# Patient Record
Sex: MALE | Race: WHITE | URBAN - METROPOLITAN AREA
[De-identification: names, ages, dates, MRNs, and addresses within clinical notes are randomized per-mention and may not be internally consistent; named-entity substitution may affect disease eponyms.]

---

## 2020-12-13 PROCEDURE — 99285 EMERGENCY DEPT VISIT HI MDM: CPT

## 2020-12-14 ENCOUNTER — INPATIENT (INPATIENT)
Facility: HOSPITAL | Age: 85
LOS: 3 days | Discharge: NURSING, REHAB OR LONG-TERM CARE | End: 2020-12-18
Payer: MEDICARE

## 2020-12-14 PROCEDURE — 93010 ELECTROCARDIOGRAM REPORT: CPT

## 2020-12-14 PROCEDURE — 73620 X-RAY EXAM OF FOOT: CPT | Mod: 26,RT

## 2020-12-14 PROCEDURE — 71045 X-RAY EXAM CHEST 1 VIEW: CPT | Mod: 26

## 2020-12-14 PROCEDURE — 93971 EXTREMITY STUDY: CPT | Mod: 26,RT

## 2021-02-15 ENCOUNTER — EMERGENCY (EMERGENCY)
Facility: HOSPITAL | Age: 86
LOS: 0 days | Discharge: HOME | End: 2021-02-15
Attending: EMERGENCY MEDICINE
Payer: MEDICARE

## 2021-02-15 VITALS
HEIGHT: 68 IN | TEMPERATURE: 97 F | WEIGHT: 169.98 LBS | DIASTOLIC BLOOD PRESSURE: 103 MMHG | HEART RATE: 72 BPM | RESPIRATION RATE: 18 BRPM | SYSTOLIC BLOOD PRESSURE: 160 MMHG | OXYGEN SATURATION: 97 %

## 2021-02-15 VITALS
RESPIRATION RATE: 16 BRPM | SYSTOLIC BLOOD PRESSURE: 146 MMHG | HEART RATE: 64 BPM | OXYGEN SATURATION: 98 % | DIASTOLIC BLOOD PRESSURE: 90 MMHG
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
## 2021-02-15 DIAGNOSIS — F32.9 MAJOR DEPRESSIVE DISORDER, SINGLE EPISODE, UNSPECIFIED: ICD-10-CM

## 2021-02-15 DIAGNOSIS — Z04.3 ENCOUNTER FOR EXAMINATION AND OBSERVATION FOLLOWING OTHER ACCIDENT: ICD-10-CM

## 2021-02-15 DIAGNOSIS — F03.90 UNSPECIFIED DEMENTIA WITHOUT BEHAVIORAL DISTURBANCE: ICD-10-CM
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
## 2021-02-15 DIAGNOSIS — S09.90XA UNSPECIFIED INJURY OF HEAD, INITIAL ENCOUNTER: ICD-10-CM

## 2021-02-15 PROCEDURE — 93005 ELECTROCARDIOGRAM TRACING: CPT

## 2021-02-15 PROCEDURE — 93010 ELECTROCARDIOGRAM REPORT: CPT

## 2021-02-15 PROCEDURE — 70450 CT HEAD/BRAIN W/O DYE: CPT

## 2021-02-15 PROCEDURE — 99284 EMERGENCY DEPT VISIT MOD MDM: CPT | Mod: 25

## 2021-02-15 PROCEDURE — 99283 EMERGENCY DEPT VISIT LOW MDM: CPT

## 2021-02-15 PROCEDURE — 70450 CT HEAD/BRAIN W/O DYE: CPT | Mod: 26

## 2021-02-15 NOTE — ED PROVIDER NOTE - OBJECTIVE STATEMENT
86 y/o M with PMHx of dementia presents to the ED BIBEMS from [facility] s/p mechanical trip and fall striking head. Has no complaints of pain. No LOC or fever. Does not recall fall. Not on AC. NKDA. Pt is poor historian 2/2 baseline dementia. PCP: Dr. Candelaria. 86 y/o M with PMHx of dementia, depression presents to the ED BIBEMS from [facility] s/p mechanical trip and fall striking head. Has no complaints of pain. No LOC or fever. Does not recall fall. Not on AC. NKDA. Pt is poor historian 2/2 baseline dementia. PCP: Dr. Candelaria.

## 2021-02-15 NOTE — ED ADULT NURSE NOTE - OBJECTIVE STATEMENT
Pt brought in from [facility] for unwitnessed fall. Hx dementia, poor historian. Pt alert but aox2. Pt thinks he landed on back but not sure. Denies any pain. Pt brought in from [facility] for unwitnessed fall. Hx dementia, poor historian. Pt alert but aox2. Pt thinks he landed on back but not sure. Denies any pain. Pt was taken directly to CT scan from EMS

## 2021-02-15 NOTE — ED PROVIDER NOTE - PATIENT PORTAL LINK FT
You can access the FollowMyHealth Patient Portal offered by [facility] by registering at the following website: [email or web address]. By joining [employer]’s FollowMyHealth portal, you will also be able to view your health information using other applications (apps) compatible with our system.

## 2021-02-15 NOTE — ED ADULT NURSE NOTE - NSIMPLEMENTINTERV_GEN_ALL_ED
Implemented All Fall with Harm Risk Interventions:  [place] to call system. Call bell, personal items and telephone within reach. Instruct patient to call for assistance. Room bathroom lighting operational. Non-slip footwear when patient is off stretcher. Physically safe environment: no spills, clutter or unnecessary equipment. Stretcher in lowest position, wheels locked, appropriate side rails in place. Provide visual cue, wrist band, yellow gown, etc. Monitor gait and stability. Monitor for mental status changes and reorient to person, place, and time. Review medications for side effects contributing to fall risk. Reinforce activity limits and safety measures with patient and family. Provide visual clues: red socks.

## 2021-02-15 NOTE — ED ADULT TRIAGE NOTE - CHIEF COMPLAINT QUOTE
pt bibems from [facility] for mechanical fall, as per ems, pt tripped and fell, head injury.  Denies loc, pt not on blood thinners, neuro alert initiated, md lazo made aware, pt sent to CT

## 2021-02-15 NOTE — ED PROVIDER NOTE - NS_ ATTENDINGSCRIBEDETAILS _ED_A_ED_FT
I, Trever Beal MD,  performed the initial face to face bedside interview with this patient regarding history of present illness, review of symptoms and relevant past medical, social and family history.  I completed an independent physical examination.    The history, relevant review of systems, past medical and surgical history, medical decision making, and physical examination was documented by the scribe in my presence and I attest to the accuracy of the documentation.

## 2021-02-15 NOTE — ED ADULT NURSE NOTE - CHIEF COMPLAINT QUOTE
pt bibems from [facility] for mechanical fall, as per ems, pt tripped and fell, head injury.  Denies loc, pt not on blood thinners, neuro alert initiated, md lazo made aware, pt sent to CT

## 2021-05-02 ENCOUNTER — EMERGENCY (EMERGENCY)
Facility: HOSPITAL | Age: 86
LOS: 0 days | Discharge: HOME | End: 2021-05-02
Attending: EMERGENCY MEDICINE
Payer: MEDICARE

## 2021-05-02 VITALS
HEART RATE: 72 BPM | SYSTOLIC BLOOD PRESSURE: 152 MMHG | TEMPERATURE: 98 F | DIASTOLIC BLOOD PRESSURE: 88 MMHG | OXYGEN SATURATION: 98 % | RESPIRATION RATE: 18 BRPM

## 2021-05-02 VITALS
OXYGEN SATURATION: 93 % | WEIGHT: 175.05 LBS | HEIGHT: 68 IN | TEMPERATURE: 98 F | SYSTOLIC BLOOD PRESSURE: 145 MMHG | HEART RATE: 76 BPM | DIASTOLIC BLOOD PRESSURE: 95 MMHG | RESPIRATION RATE: 18 BRPM

## 2021-05-02 DIAGNOSIS — I44.0 ATRIOVENTRICULAR BLOCK, FIRST DEGREE: ICD-10-CM

## 2021-05-02 DIAGNOSIS — F32.9 MAJOR DEPRESSIVE DISORDER, SINGLE EPISODE, UNSPECIFIED: ICD-10-CM

## 2021-05-02 DIAGNOSIS — F03.90 UNSPECIFIED DEMENTIA WITHOUT BEHAVIORAL DISTURBANCE: ICD-10-CM

## 2021-05-02 DIAGNOSIS — F60.3 BORDERLINE PERSONALITY DISORDER: ICD-10-CM

## 2021-05-02 LAB
ALBUMIN SERPL ELPH-MCNC: 3.4 G/DL — SIGNIFICANT CHANGE UP (ref 3.3–5)
ALP SERPL-CCNC: 92 U/L — SIGNIFICANT CHANGE UP (ref 40–120)
ALT FLD-CCNC: 13 U/L — SIGNIFICANT CHANGE UP (ref 12–78)
ANION GAP SERPL CALC-SCNC: 3 MMOL/L — LOW (ref 5–17)
APPEARANCE UR: ABNORMAL
AST SERPL-CCNC: 29 U/L — SIGNIFICANT CHANGE UP (ref 15–37)
BASOPHILS # BLD AUTO: 0.02 K/UL — SIGNIFICANT CHANGE UP (ref 0–0.2)
BASOPHILS NFR BLD AUTO: 0.4 % — SIGNIFICANT CHANGE UP (ref 0–2)
BILIRUB SERPL-MCNC: 1.2 MG/DL — SIGNIFICANT CHANGE UP (ref 0.2–1.2)
BILIRUB UR-MCNC: NEGATIVE — SIGNIFICANT CHANGE UP
BUN SERPL-MCNC: 15 MG/DL — SIGNIFICANT CHANGE UP (ref 7–23)
CALCIUM SERPL-MCNC: 9 MG/DL — SIGNIFICANT CHANGE UP (ref 8.5–10.1)
CHLORIDE SERPL-SCNC: 107 MMOL/L — SIGNIFICANT CHANGE UP (ref 96–108)
CK SERPL-CCNC: 193 U/L — SIGNIFICANT CHANGE UP (ref 26–308)
CO2 SERPL-SCNC: 31 MMOL/L — SIGNIFICANT CHANGE UP (ref 22–31)
COLOR SPEC: YELLOW — SIGNIFICANT CHANGE UP
CREAT SERPL-MCNC: 1.14 MG/DL — SIGNIFICANT CHANGE UP (ref 0.5–1.3)
DIFF PNL FLD: NEGATIVE — SIGNIFICANT CHANGE UP
EOSINOPHIL # BLD AUTO: 0.01 K/UL — SIGNIFICANT CHANGE UP (ref 0–0.5)
EOSINOPHIL NFR BLD AUTO: 0.2 % — SIGNIFICANT CHANGE UP (ref 0–6)
GLUCOSE SERPL-MCNC: 88 MG/DL — SIGNIFICANT CHANGE UP (ref 70–99)
GLUCOSE UR QL: NEGATIVE MG/DL — SIGNIFICANT CHANGE UP
HCT VFR BLD CALC: 41.3 % — SIGNIFICANT CHANGE UP (ref 39–50)
HGB BLD-MCNC: 13.4 G/DL — SIGNIFICANT CHANGE UP (ref 13–17)
IMM GRANULOCYTES NFR BLD AUTO: 0.2 % — SIGNIFICANT CHANGE UP (ref 0–1.5)
KETONES UR-MCNC: ABNORMAL
LEUKOCYTE ESTERASE UR-ACNC: ABNORMAL
LYMPHOCYTES # BLD AUTO: 1.08 K/UL — SIGNIFICANT CHANGE UP (ref 1–3.3)
LYMPHOCYTES # BLD AUTO: 20.3 % — SIGNIFICANT CHANGE UP (ref 13–44)
MCHC RBC-ENTMCNC: 30.8 PG — SIGNIFICANT CHANGE UP (ref 27–34)
MCHC RBC-ENTMCNC: 32.4 GM/DL — SIGNIFICANT CHANGE UP (ref 32–36)
MCV RBC AUTO: 94.9 FL — SIGNIFICANT CHANGE UP (ref 80–100)
MONOCYTES # BLD AUTO: 0.52 K/UL — SIGNIFICANT CHANGE UP (ref 0–0.9)
MONOCYTES NFR BLD AUTO: 9.8 % — SIGNIFICANT CHANGE UP (ref 2–14)
NEUTROPHILS # BLD AUTO: 3.69 K/UL — SIGNIFICANT CHANGE UP (ref 1.8–7.4)
NEUTROPHILS NFR BLD AUTO: 69.1 % — SIGNIFICANT CHANGE UP (ref 43–77)
NITRITE UR-MCNC: NEGATIVE — SIGNIFICANT CHANGE UP
PH UR: 6.5 — SIGNIFICANT CHANGE UP (ref 5–8)
PLATELET # BLD AUTO: 160 K/UL — SIGNIFICANT CHANGE UP (ref 150–400)
POTASSIUM SERPL-MCNC: 3.9 MMOL/L — SIGNIFICANT CHANGE UP (ref 3.5–5.3)
POTASSIUM SERPL-SCNC: 3.9 MMOL/L — SIGNIFICANT CHANGE UP (ref 3.5–5.3)
PROT SERPL-MCNC: 7.3 GM/DL — SIGNIFICANT CHANGE UP (ref 6–8.3)
PROT UR-MCNC: NEGATIVE MG/DL — SIGNIFICANT CHANGE UP
RBC # BLD: 4.35 M/UL — SIGNIFICANT CHANGE UP (ref 4.2–5.8)
RBC # FLD: 12.6 % — SIGNIFICANT CHANGE UP (ref 10.3–14.5)
SODIUM SERPL-SCNC: 141 MMOL/L — SIGNIFICANT CHANGE UP (ref 135–145)
SP GR SPEC: 1.01 — SIGNIFICANT CHANGE UP (ref 1.01–1.02)
UROBILINOGEN FLD QL: 1 MG/DL
WBC # BLD: 5.33 K/UL — SIGNIFICANT CHANGE UP (ref 3.8–10.5)
WBC # FLD AUTO: 5.33 K/UL — SIGNIFICANT CHANGE UP (ref 3.8–10.5)

## 2021-05-02 PROCEDURE — 96372 THER/PROPH/DIAG INJ SC/IM: CPT

## 2021-05-02 PROCEDURE — 99284 EMERGENCY DEPT VISIT MOD MDM: CPT | Mod: 25

## 2021-05-02 PROCEDURE — 85025 COMPLETE CBC W/AUTO DIFF WBC: CPT

## 2021-05-02 PROCEDURE — 99285 EMERGENCY DEPT VISIT HI MDM: CPT

## 2021-05-02 PROCEDURE — 81001 URINALYSIS AUTO W/SCOPE: CPT

## 2021-05-02 PROCEDURE — 93005 ELECTROCARDIOGRAM TRACING: CPT

## 2021-05-02 PROCEDURE — 82550 ASSAY OF CK (CPK): CPT

## 2021-05-02 PROCEDURE — 36415 COLL VENOUS BLD VENIPUNCTURE: CPT

## 2021-05-02 PROCEDURE — 87086 URINE CULTURE/COLONY COUNT: CPT

## 2021-05-02 PROCEDURE — 70450 CT HEAD/BRAIN W/O DYE: CPT

## 2021-05-02 PROCEDURE — 70450 CT HEAD/BRAIN W/O DYE: CPT | Mod: 26

## 2021-05-02 PROCEDURE — 93010 ELECTROCARDIOGRAM REPORT: CPT

## 2021-05-02 PROCEDURE — 80053 COMPREHEN METABOLIC PANEL: CPT

## 2021-05-02 RX ORDER — SODIUM CHLORIDE 9 MG/ML
500 INJECTION INTRAMUSCULAR; INTRAVENOUS; SUBCUTANEOUS ONCE
Refills: 0 | Status: COMPLETED | OUTPATIENT
Start: 2021-05-02 | End: 2021-05-02

## 2021-05-02 RX ORDER — HALOPERIDOL DECANOATE 100 MG/ML
2 INJECTION INTRAMUSCULAR ONCE
Refills: 0 | Status: COMPLETED | OUTPATIENT
Start: 2021-05-02 | End: 2021-05-02

## 2021-05-02 RX ORDER — QUETIAPINE FUMARATE 200 MG/1
25 TABLET, FILM COATED ORAL ONCE
Refills: 0 | Status: COMPLETED | OUTPATIENT
Start: 2021-05-02 | End: 2021-05-02

## 2021-05-02 RX ADMIN — HALOPERIDOL DECANOATE 2 MILLIGRAM(S): 100 INJECTION INTRAMUSCULAR at 17:08

## 2021-05-02 RX ADMIN — QUETIAPINE FUMARATE 25 MILLIGRAM(S): 200 TABLET, FILM COATED ORAL at 18:24

## 2021-05-02 RX ADMIN — SODIUM CHLORIDE 1000 MILLILITER(S): 9 INJECTION INTRAMUSCULAR; INTRAVENOUS; SUBCUTANEOUS at 13:41

## 2021-05-02 NOTE — ED PROVIDER NOTE - UNABLE TO OBTAIN
Pt unable to contribute to ROS 2/2 hx dementia. Dementia Pt unable to contribute accurate hx 2/2 hx dementia.

## 2021-05-02 NOTE — ED ADULT NURSE NOTE - OBJECTIVE STATEMENT
Patient brought in by ambulance for aggressive behavior at [facility]. Patient has been calm and cooperative while in the ED. Patient alert and very forgetful.

## 2021-05-02 NOTE — ED ADULT NURSE NOTE - CHIEF COMPLAINT QUOTE
pt presents to ED with complaints of aggressive behavior at [facility]. per EMS, pt was walking the track at [facility] when he became aggressive with staff when he did not want to walk anymore. pt has no medical complaints. pt has hx of dementia.

## 2021-05-02 NOTE — ED PROVIDER NOTE - OBJECTIVE STATEMENT
87 y/o M PMHx of dementia, depression presents ambulatory BIBA to ED sent by [facility] as staff states pt is w/ aggressive behavior. Pt was walking the track at [facility] and reportedly became aggressive w/ staff when he did not want to walk anymore. Pt endorses slight lightheadedness when lying down to sitting up. Denies fall, no n/v/d. 87 y/o M PMHx of dementia, depression presents ambulatory BIBA to ED sent by [facility] as staff states pt is w/ aggressive behavior. Pt states he was walking the track at [facility] x3-4 hours. Per  [facility] staff reportedly became aggressive w/ staff when he did not want to walk anymore. Pt endorses slight lightheadedness when lying down to sitting up. Denies fall, no n/v/d. 85 y/o M PMHx of dementia, depression presents ambulatory BIBA to ED sent by [facility] as staff states pt is w/ aggressive behavior. Pt states he was walking the track at [facility] x3-4 hours. Per [facility] staff, pt reportedly became aggressive w/ staff when he did not want to walk anymore. Pt endorses slight lightheadedness when lying down to sitting up. Denies fall, no n/v/d.

## 2021-05-02 NOTE — ED PROVIDER NOTE - PROGRESS NOTE DETAILS
Ade Samuels for attending Dr. Almonte: [id number] Attempted to call [facility] ([phone]) multiple rings not able to . Called pt's son Mario Ragland ([phone]), pt was called to see pt by [facility] staff due to pt not acting himself and not walking, however, when son arrived at [facility], pt was acting appropriately and walking. Pt has hx of dementia, has not been sick, no v/d. Raymon Bryant MD : signed out to me pending labs, labs wnl, d/w son gabriela will dc back to [facility]

## 2021-05-02 NOTE — ED PROVIDER NOTE - CARDIAC, MLM
Normal rate, regular rhythm.  Heart sounds S1, S2.  No murmurs, rubs or gallops. Normal rate, regular rhythm.  Heart sounds S1, S2.  No murmurs, rubs or gallops. No pedal edema.

## 2021-05-02 NOTE — ED PROVIDER NOTE - PATIENT PORTAL LINK FT
You can access the FollowMyHealth Patient Portal offered by [facility] by registering at the following website: [email or web address]. By joining [employer]’s FollowMyHealth portal, you will also be able to view your health information using other applications (apps) compatible with our system.

## 2021-05-02 NOTE — ED ADULT NURSE NOTE - NS ED NOTE ABUSE RESPONSE YN
Principal Discharge DX:	Arthritis  Goal:	Increase mobility  Instructions for follow-up, activity and diet:	Wound assessment
Unable to assess due to medical condition

## 2021-05-02 NOTE — ED ADULT TRIAGE NOTE - BP NONINVASIVE DIASTOLIC (MM HG)
Patient declined to placed on BIPAP unit, Patient currently on HFNC 30 LPM 90% with SpO2 98%. All nebs given as ordered. Will continue to monitor.    Lesa Patino RT  10/9/2017     95

## 2021-05-02 NOTE — ED PROVIDER NOTE - NSFOLLOWUPINSTRUCTIONS_ED_ALL_ED_FT
Altered Mental Status    WHAT YOU NEED TO KNOW:    What is altered mental status? Altered mental status (AMS) is a disruption in how your brain works that causes a change in behavior. This change can happen suddenly or over days. AMS ranges from slight confusion to total disorientation and increased sleepiness to coma.     What causes AMS? AMS can be caused by physical, psychological, and environmental factors. In older adults, AMS may be caused by a combination of these factors. The following are some of the most common causes of AMS:  •Diseases or conditions in the body that can affect your brain and nervous system: ?Hypoxia (low oxygen levels)       ?Low or high blood sugar levels, or diabetic ketoacidosis      ?Heart attack       ?Dehydration, low or high blood sodium levels       ?Thyroid or adrenal gland disease       ?Urinary tract infection or renal failure       •Diseases or conditions within your skull: ?Seizures       ?Head traumas, concussions       ?Brain tumors or strokes       ?Brain disease, such as encephalopathy      ?High altitude cerebral edema (brain tissue swelling at high altitude)       •Other factors: ?Burns      ?Hypothermia (low body temperature), hyperthermia (high body temperature), or heat stroke       ?Toxic plants, such as mushrooms      ?Carbon monoxide       ?Drug or alcohol withdrawal       ?Psychiatric problems         What factors increase the risk for AMS?   •Older adults may have AMS after changes in their medicines, heart attacks, or hip fractures. Infections, such as urinary tract infections or pneumonia, can also increase the risk for AMS.      •A medical history of high blood pressure, heart problems, diabetes, or psychiatric illness increases the risk for AMS.       What are the signs and symptoms of AMS? You, or those close to you, will notice changes in how you think and in your awareness, movements, and routines. Some of the more common signs are:   •Lack of concentration or forgetfulness       •Slow responses       •Hallucinations and changes in sleep patterns       •Decreased or increased movement       •Agitation or rambling speech       •Cannot or will not follow reasonable requests       •Cannot be aroused from sleep       How is AMS diagnosed? Your healthcare provider will ask you and your family about your usual mental status. He will want know if the changes happened suddenly or over time. He will want to know about recent events, such as falls or other traumas or illnesses. He may ask witnesses about your behavior. Your healthcare provider will ask about the medicines you take and any problems with substance abuse. He will do a physical exam. You may also need the following tests to learn the cause of your AMS:  •A neurological exam tells healthcare providers if you are having problems with your brain or nerves. During the exam, your reflexes will be tested.       •Vital signs give healthcare providers information about your current health. Healthcare providers will check your blood pressure, heart rate, breathing rate, and temperature. They will also ask about your pain. They will measure the amount of oxygen in your blood with a device called a pulse oximeter.       •Blood tests are done to check the function of your liver, kidneys, and lungs. They will also show if you have an infection or other toxins in your body. Your blood glucose level will also be checked.      •A chest x-ray  is a picture of your lungs and heart. Healthcare providers use the x-ray to look for signs of infection, such as pneumonia, that could be causing your AMS.      •A CT scan is also called a CAT scan. An x-ray machine uses a computer to take pictures of your head. The pictures may show a tumor, swelling, or bleeding on the brain.       •A lumbar puncture is also called a spinal tap. During a lumbar puncture, you will need to lie still. Healthcare providers may give you medicine to numb a small area of your back. A needle will be put in and fluid will be removed from around your spinal cord. The fluid will be sent to a lab for tests. The tests check for infection, bleeding around your brain and spinal cord, or other problems.       How is AMS treated? Treatment will depend on the cause of your AMS. Treatments with oxygen and medicines may be needed to improve your symptoms. You may be placed in the hospital if your symptoms are severe.     When should I or someone close to me contact my healthcare provider?   •You have sudden changes in behavior.       •You are more sleepy or confused than usual.       •You have questions or concerns about your condition or care.       When should I or someone close to me seek immediate care?   •Your speech is slurred or you are rambling.       •You have a seizure.       •You are not able to move any part of your body freely.       •You cannot be woken up.       CARE AGREEMENT:    You have the right to help plan your care. Learn about your health condition and how it may be treated. Discuss treatment options with your healthcare providers to decide what care you want to receive. You always have the right to refuse treatment.        © Copyright [employer] 2021           back to top                          © Copyright [employer] 2021

## 2021-05-02 NOTE — ED ADULT TRIAGE NOTE - RESPIRATORY RATE (BREATHS/MIN)
MRI head - Neg , neuro consulted   EEG - pt refusing per neuro  accepting some food  6/7/2017 Pt NPO until he wakes up.  6/8/2017 Patient awake and able to resume diet. NPO after midnight for VATS 18

## 2021-05-03 LAB
CULTURE RESULTS: NO GROWTH — SIGNIFICANT CHANGE UP
SPECIMEN SOURCE: SIGNIFICANT CHANGE UP

## 2021-07-08 ENCOUNTER — EMERGENCY (EMERGENCY)
Facility: HOSPITAL | Age: 86
LOS: 0 days | Discharge: HOME | End: 2021-07-08
Attending: STUDENT IN AN ORGANIZED HEALTH CARE EDUCATION/TRAINING PROGRAM
Payer: MEDICARE

## 2021-07-08 VITALS
RESPIRATION RATE: 18 BRPM | SYSTOLIC BLOOD PRESSURE: 164 MMHG | OXYGEN SATURATION: 97 % | HEIGHT: 68 IN | TEMPERATURE: 98 F | DIASTOLIC BLOOD PRESSURE: 104 MMHG | WEIGHT: 169.98 LBS | HEART RATE: 63 BPM

## 2021-07-08 VITALS
DIASTOLIC BLOOD PRESSURE: 81 MMHG | OXYGEN SATURATION: 98 % | TEMPERATURE: 98 F | HEART RATE: 91 BPM | RESPIRATION RATE: 16 BRPM | SYSTOLIC BLOOD PRESSURE: 122 MMHG

## 2021-07-08 DIAGNOSIS — R07.9 CHEST PAIN, UNSPECIFIED: ICD-10-CM

## 2021-07-08 DIAGNOSIS — F03.90 UNSPECIFIED DEMENTIA, UNSPECIFIED SEVERITY, WITHOUT BEHAVIORAL DISTURBANCE, PSYCHOTIC DISTURBANCE, MOOD DISTURBANCE, AND ANXIETY: ICD-10-CM

## 2021-07-08 DIAGNOSIS — Z20.822 CONTACT WITH AND (SUSPECTED) EXPOSURE TO COVID-19: ICD-10-CM

## 2021-07-08 DIAGNOSIS — R10.9 UNSPECIFIED ABDOMINAL PAIN: ICD-10-CM

## 2021-07-08 DIAGNOSIS — F32.9 MAJOR DEPRESSIVE DISORDER, SINGLE EPISODE, UNSPECIFIED: ICD-10-CM

## 2021-07-08 LAB
ALBUMIN SERPL ELPH-MCNC: 3.5 G/DL — SIGNIFICANT CHANGE UP (ref 3.3–5)
ALP SERPL-CCNC: 100 U/L — SIGNIFICANT CHANGE UP (ref 40–120)
ALT FLD-CCNC: 17 U/L — SIGNIFICANT CHANGE UP (ref 12–78)
ANION GAP SERPL CALC-SCNC: 5 MMOL/L — SIGNIFICANT CHANGE UP (ref 5–17)
APPEARANCE UR: CLEAR — SIGNIFICANT CHANGE UP
APTT BLD: 31.5 SEC — SIGNIFICANT CHANGE UP (ref 27.5–35.5)
AST SERPL-CCNC: 10 U/L — LOW (ref 15–37)
BASOPHILS # BLD AUTO: 0.02 K/UL — SIGNIFICANT CHANGE UP (ref 0–0.2)
BASOPHILS NFR BLD AUTO: 0.4 % — SIGNIFICANT CHANGE UP (ref 0–2)
BILIRUB SERPL-MCNC: 1.2 MG/DL — SIGNIFICANT CHANGE UP (ref 0.2–1.2)
BILIRUB UR-MCNC: NEGATIVE — SIGNIFICANT CHANGE UP
BUN SERPL-MCNC: 17 MG/DL — SIGNIFICANT CHANGE UP (ref 7–23)
CALCIUM SERPL-MCNC: 8.6 MG/DL — SIGNIFICANT CHANGE UP (ref 8.5–10.1)
CHLORIDE SERPL-SCNC: 108 MMOL/L — SIGNIFICANT CHANGE UP (ref 96–108)
CO2 SERPL-SCNC: 30 MMOL/L — SIGNIFICANT CHANGE UP (ref 22–31)
COLOR SPEC: YELLOW — SIGNIFICANT CHANGE UP
CREAT SERPL-MCNC: 0.88 MG/DL — SIGNIFICANT CHANGE UP (ref 0.5–1.3)
DIFF PNL FLD: NEGATIVE — SIGNIFICANT CHANGE UP
EOSINOPHIL # BLD AUTO: 0.03 K/UL — SIGNIFICANT CHANGE UP (ref 0–0.5)
EOSINOPHIL NFR BLD AUTO: 0.6 % — SIGNIFICANT CHANGE UP (ref 0–6)
GLUCOSE SERPL-MCNC: 94 MG/DL — SIGNIFICANT CHANGE UP (ref 70–99)
GLUCOSE UR QL: NEGATIVE MG/DL — SIGNIFICANT CHANGE UP
HCT VFR BLD CALC: 42.1 % — SIGNIFICANT CHANGE UP (ref 39–50)
HGB BLD-MCNC: 13.2 G/DL — SIGNIFICANT CHANGE UP (ref 13–17)
IMM GRANULOCYTES NFR BLD AUTO: 0 % — SIGNIFICANT CHANGE UP (ref 0–1.5)
INR BLD: 1.14 RATIO — SIGNIFICANT CHANGE UP (ref 0.88–1.16)
KETONES UR-MCNC: NEGATIVE — SIGNIFICANT CHANGE UP
LEUKOCYTE ESTERASE UR-ACNC: NEGATIVE — SIGNIFICANT CHANGE UP
LIDOCAIN IGE QN: 41 U/L — LOW (ref 73–393)
LYMPHOCYTES # BLD AUTO: 1.58 K/UL — SIGNIFICANT CHANGE UP (ref 1–3.3)
LYMPHOCYTES # BLD AUTO: 31 % — SIGNIFICANT CHANGE UP (ref 13–44)
MCHC RBC-ENTMCNC: 30.3 PG — SIGNIFICANT CHANGE UP (ref 27–34)
MCHC RBC-ENTMCNC: 31.4 GM/DL — LOW (ref 32–36)
MCV RBC AUTO: 96.8 FL — SIGNIFICANT CHANGE UP (ref 80–100)
MONOCYTES # BLD AUTO: 0.62 K/UL — SIGNIFICANT CHANGE UP (ref 0–0.9)
MONOCYTES NFR BLD AUTO: 12.2 % — SIGNIFICANT CHANGE UP (ref 2–14)
NEUTROPHILS # BLD AUTO: 2.84 K/UL — SIGNIFICANT CHANGE UP (ref 1.8–7.4)
NEUTROPHILS NFR BLD AUTO: 55.8 % — SIGNIFICANT CHANGE UP (ref 43–77)
NITRITE UR-MCNC: NEGATIVE — SIGNIFICANT CHANGE UP
PH UR: 6 — SIGNIFICANT CHANGE UP (ref 5–8)
PLATELET # BLD AUTO: 141 K/UL — LOW (ref 150–400)
POTASSIUM SERPL-MCNC: 3.7 MMOL/L — SIGNIFICANT CHANGE UP (ref 3.5–5.3)
POTASSIUM SERPL-SCNC: 3.7 MMOL/L — SIGNIFICANT CHANGE UP (ref 3.5–5.3)
PROT SERPL-MCNC: 6.6 GM/DL — SIGNIFICANT CHANGE UP (ref 6–8.3)
PROT UR-MCNC: NEGATIVE MG/DL — SIGNIFICANT CHANGE UP
PROTHROM AB SERPL-ACNC: 13.1 SEC — SIGNIFICANT CHANGE UP (ref 10.6–13.6)
RBC # BLD: 4.35 M/UL — SIGNIFICANT CHANGE UP (ref 4.2–5.8)
RBC # FLD: 12.8 % — SIGNIFICANT CHANGE UP (ref 10.3–14.5)
SODIUM SERPL-SCNC: 143 MMOL/L — SIGNIFICANT CHANGE UP (ref 135–145)
SP GR SPEC: 1.01 — SIGNIFICANT CHANGE UP (ref 1.01–1.02)
TROPONIN I SERPL-MCNC: <0.015 NG/ML — SIGNIFICANT CHANGE UP (ref 0.01–0.04)
UROBILINOGEN FLD QL: NEGATIVE MG/DL — SIGNIFICANT CHANGE UP
WBC # BLD: 5.09 K/UL — SIGNIFICANT CHANGE UP (ref 3.8–10.5)
WBC # FLD AUTO: 5.09 K/UL — SIGNIFICANT CHANGE UP (ref 3.8–10.5)

## 2021-07-08 PROCEDURE — U0003: CPT

## 2021-07-08 PROCEDURE — 71045 X-RAY EXAM CHEST 1 VIEW: CPT

## 2021-07-08 PROCEDURE — 80053 COMPREHEN METABOLIC PANEL: CPT

## 2021-07-08 PROCEDURE — 71045 X-RAY EXAM CHEST 1 VIEW: CPT | Mod: 26

## 2021-07-08 PROCEDURE — 87086 URINE CULTURE/COLONY COUNT: CPT

## 2021-07-08 PROCEDURE — 93005 ELECTROCARDIOGRAM TRACING: CPT

## 2021-07-08 PROCEDURE — 85730 THROMBOPLASTIN TIME PARTIAL: CPT

## 2021-07-08 PROCEDURE — U0005: CPT

## 2021-07-08 PROCEDURE — 81003 URINALYSIS AUTO W/O SCOPE: CPT

## 2021-07-08 PROCEDURE — 85025 COMPLETE CBC W/AUTO DIFF WBC: CPT

## 2021-07-08 PROCEDURE — 93010 ELECTROCARDIOGRAM REPORT: CPT

## 2021-07-08 PROCEDURE — 83690 ASSAY OF LIPASE: CPT

## 2021-07-08 PROCEDURE — 99285 EMERGENCY DEPT VISIT HI MDM: CPT

## 2021-07-08 PROCEDURE — 99284 EMERGENCY DEPT VISIT MOD MDM: CPT | Mod: 25

## 2021-07-08 PROCEDURE — 36415 COLL VENOUS BLD VENIPUNCTURE: CPT

## 2021-07-08 PROCEDURE — 85610 PROTHROMBIN TIME: CPT

## 2021-07-08 PROCEDURE — 84484 ASSAY OF TROPONIN QUANT: CPT

## 2021-07-08 NOTE — ED ADULT NURSE NOTE - CHIEF COMPLAINT QUOTE
Pt brought in by ambulance from [facility] complaining of chest pain and abdominal pain that began approx 2 hours prior to arrival. Pt with hx Alzheimers.

## 2021-07-08 NOTE — ED ADULT NURSE NOTE - OBJECTIVE STATEMENT
Pt brought in by ambulance from [facility] complaining of chest pain and abdominal pain that began approx. 2 hours prior to arrival. Pt with hx Alzheimers. pt with no complaints upon arrival to ED.

## 2021-07-08 NOTE — ED PROVIDER NOTE - OBJECTIVE STATEMENT
85 y/o male with a  PMHx of dementia, depression presents to ED BIBA sent from [facility]. Per transfer paper, pt was c/o CP and abdominal pain. Pt denies these complaints. Hx limited due to pt's hx of Dementia.

## 2021-07-08 NOTE — ED ADULT NURSE NOTE - NSIMPLEMENTINTERV_GEN_ALL_ED
Implemented All Universal Safety Interventions:  [place] to call system. Call bell, personal items and telephone within reach. Instruct patient to call for assistance. Room bathroom lighting operational. Non-slip footwear when patient is off stretcher. Physically safe environment: no spills, clutter or unnecessary equipment. Stretcher in lowest position, wheels locked, appropriate side rails in place.

## 2021-07-08 NOTE — ED PROVIDER NOTE - PATIENT PORTAL LINK FT
You can access the FollowMyHealth Patient Portal offered by [facility] by registering at the following website: [email or web address]. By joining [employer]’s FollowMyHealth portal, you will also be able to view your health information using other applications (apps) compatible with our system.

## 2021-07-08 NOTE — ED ADULT TRIAGE NOTE - CHIEF COMPLAINT QUOTE
Pt brought in by ambulance from [facility] complaining of chest pain and abdominal pain that began approx 2 hours prior to arrival. Pt with hx Alzheimers.

## 2021-07-08 NOTE — ED PROVIDER NOTE - PROGRESS NOTE DETAILS
Marcella DO: patient with no abdominal tenderness on re-evaluation; labs wnl; ua neg; cxr unchanged from old; ekg unchanged from old; patient tolerated po; no vomiting; spoke with Dr. Annelise grullon to send back to facility; will continue to monitor his symptoms there- patient with no complaints at this time; strict return precautions given.

## 2021-07-09 ENCOUNTER — EMERGENCY (EMERGENCY)
Facility: HOSPITAL | Age: 86
LOS: 0 days | Discharge: HOME | End: 2021-07-09
Attending: EMERGENCY MEDICINE
Payer: MEDICARE

## 2021-07-09 VITALS
RESPIRATION RATE: 18 BRPM | HEIGHT: 68 IN | OXYGEN SATURATION: 97 % | HEART RATE: 59 BPM | WEIGHT: 179.9 LBS | SYSTOLIC BLOOD PRESSURE: 110 MMHG | TEMPERATURE: 98 F | DIASTOLIC BLOOD PRESSURE: 74 MMHG

## 2021-07-09 VITALS
TEMPERATURE: 98 F | RESPIRATION RATE: 18 BRPM | SYSTOLIC BLOOD PRESSURE: 141 MMHG | HEART RATE: 65 BPM | DIASTOLIC BLOOD PRESSURE: 93 MMHG | OXYGEN SATURATION: 99 %

## 2021-07-09 DIAGNOSIS — R00.1 BRADYCARDIA, UNSPECIFIED: ICD-10-CM

## 2021-07-09 DIAGNOSIS — F03.90 UNSPECIFIED DEMENTIA, UNSPECIFIED SEVERITY, WITHOUT BEHAVIORAL DISTURBANCE, PSYCHOTIC DISTURBANCE, MOOD DISTURBANCE, AND ANXIETY: ICD-10-CM

## 2021-07-09 DIAGNOSIS — R53.1 WEAKNESS: ICD-10-CM

## 2021-07-09 LAB
ALBUMIN SERPL ELPH-MCNC: 3.4 G/DL — SIGNIFICANT CHANGE UP (ref 3.3–5)
ALP SERPL-CCNC: 94 U/L — SIGNIFICANT CHANGE UP (ref 40–120)
ALT FLD-CCNC: 16 U/L — SIGNIFICANT CHANGE UP (ref 12–78)
ANION GAP SERPL CALC-SCNC: 3 MMOL/L — LOW (ref 5–17)
APPEARANCE UR: CLEAR — SIGNIFICANT CHANGE UP
AST SERPL-CCNC: 11 U/L — LOW (ref 15–37)
BASOPHILS # BLD AUTO: 0.01 K/UL — SIGNIFICANT CHANGE UP (ref 0–0.2)
BASOPHILS NFR BLD AUTO: 0.2 % — SIGNIFICANT CHANGE UP (ref 0–2)
BILIRUB SERPL-MCNC: 1.3 MG/DL — HIGH (ref 0.2–1.2)
BILIRUB UR-MCNC: NEGATIVE — SIGNIFICANT CHANGE UP
BUN SERPL-MCNC: 22 MG/DL — SIGNIFICANT CHANGE UP (ref 7–23)
CALCIUM SERPL-MCNC: 8.6 MG/DL — SIGNIFICANT CHANGE UP (ref 8.5–10.1)
CHLORIDE SERPL-SCNC: 109 MMOL/L — HIGH (ref 96–108)
CO2 SERPL-SCNC: 32 MMOL/L — HIGH (ref 22–31)
COLOR SPEC: YELLOW — SIGNIFICANT CHANGE UP
CREAT SERPL-MCNC: 1.05 MG/DL — SIGNIFICANT CHANGE UP (ref 0.5–1.3)
CULTURE RESULTS: SIGNIFICANT CHANGE UP
DIFF PNL FLD: NEGATIVE — SIGNIFICANT CHANGE UP
EOSINOPHIL # BLD AUTO: 0.01 K/UL — SIGNIFICANT CHANGE UP (ref 0–0.5)
EOSINOPHIL NFR BLD AUTO: 0.2 % — SIGNIFICANT CHANGE UP (ref 0–6)
GLUCOSE SERPL-MCNC: 76 MG/DL — SIGNIFICANT CHANGE UP (ref 70–99)
GLUCOSE UR QL: NEGATIVE MG/DL — SIGNIFICANT CHANGE UP
HCT VFR BLD CALC: 41.5 % — SIGNIFICANT CHANGE UP (ref 39–50)
HGB BLD-MCNC: 13.1 G/DL — SIGNIFICANT CHANGE UP (ref 13–17)
IMM GRANULOCYTES NFR BLD AUTO: 0.2 % — SIGNIFICANT CHANGE UP (ref 0–1.5)
KETONES UR-MCNC: ABNORMAL
LEUKOCYTE ESTERASE UR-ACNC: ABNORMAL
LYMPHOCYTES # BLD AUTO: 1.37 K/UL — SIGNIFICANT CHANGE UP (ref 1–3.3)
LYMPHOCYTES # BLD AUTO: 24.8 % — SIGNIFICANT CHANGE UP (ref 13–44)
MCHC RBC-ENTMCNC: 30.6 PG — SIGNIFICANT CHANGE UP (ref 27–34)
MCHC RBC-ENTMCNC: 31.6 GM/DL — LOW (ref 32–36)
MCV RBC AUTO: 97 FL — SIGNIFICANT CHANGE UP (ref 80–100)
MONOCYTES # BLD AUTO: 0.66 K/UL — SIGNIFICANT CHANGE UP (ref 0–0.9)
MONOCYTES NFR BLD AUTO: 12 % — SIGNIFICANT CHANGE UP (ref 2–14)
NEUTROPHILS # BLD AUTO: 3.46 K/UL — SIGNIFICANT CHANGE UP (ref 1.8–7.4)
NEUTROPHILS NFR BLD AUTO: 62.6 % — SIGNIFICANT CHANGE UP (ref 43–77)
NITRITE UR-MCNC: NEGATIVE — SIGNIFICANT CHANGE UP
PH UR: 5 — SIGNIFICANT CHANGE UP (ref 5–8)
PLATELET # BLD AUTO: 145 K/UL — LOW (ref 150–400)
POTASSIUM SERPL-MCNC: 3.5 MMOL/L — SIGNIFICANT CHANGE UP (ref 3.5–5.3)
POTASSIUM SERPL-SCNC: 3.5 MMOL/L — SIGNIFICANT CHANGE UP (ref 3.5–5.3)
PROT SERPL-MCNC: 6.6 GM/DL — SIGNIFICANT CHANGE UP (ref 6–8.3)
PROT UR-MCNC: 30 MG/DL
RBC # BLD: 4.28 M/UL — SIGNIFICANT CHANGE UP (ref 4.2–5.8)
RBC # FLD: 12.9 % — SIGNIFICANT CHANGE UP (ref 10.3–14.5)
SODIUM SERPL-SCNC: 144 MMOL/L — SIGNIFICANT CHANGE UP (ref 135–145)
SP GR SPEC: 1.02 — SIGNIFICANT CHANGE UP (ref 1.01–1.02)
SPECIMEN SOURCE: SIGNIFICANT CHANGE UP
TROPONIN I SERPL-MCNC: <0.015 NG/ML — SIGNIFICANT CHANGE UP (ref 0.01–0.04)
UROBILINOGEN FLD QL: 1 MG/DL
WBC # BLD: 5.52 K/UL — SIGNIFICANT CHANGE UP (ref 3.8–10.5)
WBC # FLD AUTO: 5.52 K/UL — SIGNIFICANT CHANGE UP (ref 3.8–10.5)

## 2021-07-09 PROCEDURE — 70450 CT HEAD/BRAIN W/O DYE: CPT

## 2021-07-09 PROCEDURE — 99284 EMERGENCY DEPT VISIT MOD MDM: CPT | Mod: 25

## 2021-07-09 PROCEDURE — 84484 ASSAY OF TROPONIN QUANT: CPT

## 2021-07-09 PROCEDURE — 80053 COMPREHEN METABOLIC PANEL: CPT

## 2021-07-09 PROCEDURE — 99285 EMERGENCY DEPT VISIT HI MDM: CPT

## 2021-07-09 PROCEDURE — 81001 URINALYSIS AUTO W/SCOPE: CPT

## 2021-07-09 PROCEDURE — 93005 ELECTROCARDIOGRAM TRACING: CPT

## 2021-07-09 PROCEDURE — 85025 COMPLETE CBC W/AUTO DIFF WBC: CPT

## 2021-07-09 PROCEDURE — 87086 URINE CULTURE/COLONY COUNT: CPT

## 2021-07-09 PROCEDURE — 70450 CT HEAD/BRAIN W/O DYE: CPT | Mod: 26,MA

## 2021-07-09 PROCEDURE — 93010 ELECTROCARDIOGRAM REPORT: CPT

## 2021-07-09 PROCEDURE — 36415 COLL VENOUS BLD VENIPUNCTURE: CPT

## 2021-07-09 PROCEDURE — 71045 X-RAY EXAM CHEST 1 VIEW: CPT

## 2021-07-09 PROCEDURE — 71045 X-RAY EXAM CHEST 1 VIEW: CPT | Mod: 26

## 2021-07-09 NOTE — ED PROVIDER NOTE - CARE PLAN
Principal Discharge DX:	Weakness  Secondary Diagnosis:	Dementia without behavioral disturbance, unspecified dementia type

## 2021-07-09 NOTE — ED ADULT NURSE REASSESSMENT NOTE - NS ED NURSE REASSESS COMMENT FT1
report received from KELSEY pete. Pt Aox1, calm and cooperative at this time. Pt stood to urinate with assistance from RN. Not attempting to get out of bed. Fall risk precautions adhered to at this time. In no acute distress. will ctm

## 2021-07-09 NOTE — ED PROVIDER NOTE - CLINICAL SUMMARY MEDICAL DECISION MAKING FREE TEXT BOX
85 [facility], pt currently no complaints, not good historian. Will check labs, CT scan.

## 2021-07-09 NOTE — ED PROVIDER NOTE - NSFOLLOWUPINSTRUCTIONS_ED_ALL_ED_FT
Please call and follow up with your doctor in 1-3 days.    Weakness    WHAT YOU NEED TO KNOW:    Weakness is a loss of muscle strength. It may be caused by brain, nerve, or muscle problems. Physical and mental conditions such as heart problems, pregnancy, dehydration, or depression may also cause weakness. Reactions to certain drugs can cause weakness. Parts of your body may become weak if you need to wear a cast or splint or have been on bed rest for a long time.    DISCHARGE INSTRUCTIONS:    Call 911 for any of the following:     You have any of the following signs of a stroke:   Numbness or drooping on one side of your face       Weakness in an arm or leg      Confusion or difficulty speaking      Dizziness, a severe headache, or vision loss      You lose feeling in your weakened body area.      You have electric shock-like feelings down your arms and legs when you flex or move your neck.      You have sudden or increased trouble speaking, swallowing, or breathing.    Return to the emergency department if:     You have severe pain in your back, arms, or legs that worsens.      You have sudden or worsened muscle weakness or loss of movement.      You are not able to control when you urinate or have a bowel movement.    Contact your healthcare provider if:     You feel depressed or anxious.       You have questions or concerns about your condition or care.     Manage weakness:     Use assistive devices as directed. These help protect you from injury. Examples include a walker or cane. Have someone install handrails in your home. These will help you get out of a bathtub or stand up from a toilet. Use a shower chair so you can sit while you shower. Sit down on the toilet or another chair to dry off and put on your clothes. Get help going up and down stairs if your legs are weak.       Go to physical or occupational therapy if directed. A physical therapist can teach you exercises to help strengthen weak muscles. An occupational therapist can show you ways to do your daily activities more easily. For example, light forks and spoons can be easier to use if you have hand weakness. You may also learn ways to organize your household items so you are not moving heavy items.      Balance rest with exercise. Exercise can help increase your muscle strength and energy. Do not exercise for long periods at a time. Take breaks often to rest. Too much exercise can cause muscle strain or make you more tired. Ask your healthcare provider how much exercise is right for you.      Eat a variety of healthy foods. Too much or too little food may cause weakness or tiredness. Ask your healthcare provider what a healthy amount of food is for you. Healthy foods include fruits, vegetables, whole-grain breads, low-fat dairy products, lean meats and fish, nuts, and cooked beans.      Do not smoke. Nicotine and other chemicals in cigarettes and cigars can make your symptoms worse, and can cause lung damage. Ask your healthcare provider for information if you currently smoke and need help to quit. E-cigarettes or smokeless tobacco still contain nicotine. Talk to your healthcare provider before you use these products.       Do not use caffeine, alcohol, or illegal drugs. These may cause muscle twitching, which could lead to worsened weakness.     Follow up with your healthcare provider as directed: Write down your questions so you remember to ask them during your visits

## 2021-07-09 NOTE — ED ADULT NURSE NOTE - OBJECTIVE STATEMENT
Pt sent to ED from [facility] for increased AMS. Pt is A&Ox1, poor historian and unable to provide an accurate history. Pt was d/c from  yesterday after falling. Pt states he remembers event, denies head trauma or LOC. Pt denies pain or discomfort at this time. No complaints.

## 2021-07-09 NOTE — ED ADULT NURSE NOTE - NSIMPLEMENTINTERV_GEN_ALL_ED
Implemented All Fall with Harm Risk Interventions:  [place] to call system. Call bell, personal items and telephone within reach. Instruct patient to call for assistance. Room bathroom lighting operational. Non-slip footwear when patient is off stretcher. Physically safe environment: no spills, clutter or unnecessary equipment. Stretcher in lowest position, wheels locked, appropriate side rails in place. Provide visual cue, wrist band, yellow gown, etc. Monitor gait and stability. Monitor for mental status changes and reorient to person, place, and time. Review medications for side effects contributing to fall risk. Reinforce activity limits and safety measures with patient and family. Provide visual clues: red socks.

## 2021-07-09 NOTE — ED PROVIDER NOTE - PROGRESS NOTE DETAILS
Ade Hines for attending Dr. Almonte: Discussed with wellness at [facility] in [place], states pt had dec energy today. Pt was sent to ED yesterday for +chest discomfort, concerned pt has less energy, sent to ER for evaluation. Ade Hines for attending Dr. Almonte: Discussed with wellness at [facility] in [place], states pt had decrease energy today. Pt was sent to ED yesterday for +chest discomfort, concerned pt has less energy, sent to ER for evaluation.

## 2021-07-09 NOTE — ED PROVIDER NOTE - PATIENT PORTAL LINK FT
You can access the FollowMyHealth Patient Portal offered by [facility] by registering at the following website: [email or web address]. By joining [facility]’s FollowMyHealth portal, you will also be able to view your health information using other applications (apps) compatible with our system.

## 2021-07-09 NOTE — ED ADULT NURSE REASSESSMENT NOTE - NS ED NURSE REASSESS COMMENT FT1
pt refusing discharge vitals. attempted to check arms for IV access. No IV evident. Pt combative. transferred onto stretcher safely. will ctm

## 2021-07-09 NOTE — ED ADULT TRIAGE NOTE - CHIEF COMPLAINT QUOTE
Pt arrives to ED sent in by nursing home for increased confusion as per nursing staff. Pt with history of dementia. pt awake, and alert in triage. answers questions appropriately.

## 2021-07-09 NOTE — ED PROVIDER NOTE - OBJECTIVE STATEMENT
87 y/o male with a PMHx of dementia presents to the ED sent in by [facility] in [place] c/o AMS. Pt states he has no NVD, no pain. Pt is a poor historian due to dementia.

## 2021-07-10 LAB
CULTURE RESULTS: SIGNIFICANT CHANGE UP
SPECIMEN SOURCE: SIGNIFICANT CHANGE UP

## 2021-08-13 ENCOUNTER — EMERGENCY (EMERGENCY)
Facility: HOSPITAL | Age: 86
LOS: 0 days | Discharge: HOME | End: 2021-08-14
Attending: EMERGENCY MEDICINE
Payer: MEDICARE

## 2021-08-13 VITALS
HEIGHT: 68 IN | OXYGEN SATURATION: 94 % | TEMPERATURE: 99 F | HEART RATE: 63 BPM | WEIGHT: 179.9 LBS | SYSTOLIC BLOOD PRESSURE: 176 MMHG | DIASTOLIC BLOOD PRESSURE: 98 MMHG | RESPIRATION RATE: 18 BRPM
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
## 2021-08-13 DIAGNOSIS — F03.90 UNSPECIFIED DEMENTIA WITHOUT BEHAVIORAL DISTURBANCE: ICD-10-CM

## 2021-08-13 DIAGNOSIS — M54.5 LOW BACK PAIN: ICD-10-CM
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
## 2021-08-13 LAB
APPEARANCE UR: CLEAR — SIGNIFICANT CHANGE UP
BILIRUB UR-MCNC: NEGATIVE — SIGNIFICANT CHANGE UP
COLOR SPEC: YELLOW — SIGNIFICANT CHANGE UP
DIFF PNL FLD: NEGATIVE — SIGNIFICANT CHANGE UP
GLUCOSE UR QL: NEGATIVE MG/DL — SIGNIFICANT CHANGE UP
KETONES UR-MCNC: NEGATIVE — SIGNIFICANT CHANGE UP
LEUKOCYTE ESTERASE UR-ACNC: NEGATIVE — SIGNIFICANT CHANGE UP
NITRITE UR-MCNC: NEGATIVE — SIGNIFICANT CHANGE UP
PH UR: 7 — SIGNIFICANT CHANGE UP (ref 5–8)
PROT UR-MCNC: NEGATIVE MG/DL — SIGNIFICANT CHANGE UP
SP GR SPEC: 1.01 — SIGNIFICANT CHANGE UP (ref 1.01–1.02)
UROBILINOGEN FLD QL: NEGATIVE MG/DL — SIGNIFICANT CHANGE UP

## 2021-08-13 PROCEDURE — 93005 ELECTROCARDIOGRAM TRACING: CPT

## 2021-08-13 PROCEDURE — 99284 EMERGENCY DEPT VISIT MOD MDM: CPT | Mod: 25

## 2021-08-13 PROCEDURE — 81003 URINALYSIS AUTO W/O SCOPE: CPT

## 2021-08-13 PROCEDURE — 72170 X-RAY EXAM OF PELVIS: CPT | Mod: 26

## 2021-08-13 PROCEDURE — 72125 CT NECK SPINE W/O DYE: CPT

## 2021-08-13 PROCEDURE — 70450 CT HEAD/BRAIN W/O DYE: CPT

## 2021-08-13 PROCEDURE — 72170 X-RAY EXAM OF PELVIS: CPT

## 2021-08-13 PROCEDURE — 72125 CT NECK SPINE W/O DYE: CPT | Mod: 26,MA

## 2021-08-13 PROCEDURE — 71045 X-RAY EXAM CHEST 1 VIEW: CPT | Mod: 26

## 2021-08-13 PROCEDURE — 87086 URINE CULTURE/COLONY COUNT: CPT

## 2021-08-13 PROCEDURE — 72131 CT LUMBAR SPINE W/O DYE: CPT | Mod: 26,MA

## 2021-08-13 PROCEDURE — 70450 CT HEAD/BRAIN W/O DYE: CPT | Mod: 26,MA

## 2021-08-13 PROCEDURE — 93010 ELECTROCARDIOGRAM REPORT: CPT

## 2021-08-13 PROCEDURE — 99285 EMERGENCY DEPT VISIT HI MDM: CPT

## 2021-08-13 PROCEDURE — 72131 CT LUMBAR SPINE W/O DYE: CPT

## 2021-08-13 PROCEDURE — 71045 X-RAY EXAM CHEST 1 VIEW: CPT

## 2021-08-13 RX ORDER — ACETAMINOPHEN 500 MG
650 TABLET ORAL ONCE
Refills: 0 | Status: DISCONTINUED | OUTPATIENT
Start: 2021-08-13 | End: 2021-08-14

## 2021-08-13 NOTE — ED ADULT NURSE NOTE - CHIEF COMPLAINT QUOTE
pt biba s/p possible fall. Was found slumped over under a railing in the hallway. Pt states he fell. Denies complaints, no abrasions or bruises noted. Due to severe dementia, unable to determine whether pt fell.
spontaneous

## 2021-08-13 NOTE — ED PROVIDER NOTE - PATIENT PORTAL LINK FT
You can access the FollowMyHealth Patient Portal offered by [facility] by registering at the following website: [email or web address]. By joining [employer]’s FollowMyHealth portal, you will also be able to view your health information using other applications (apps) compatible with our system.

## 2021-08-13 NOTE — ED PROVIDER NOTE - CLINICAL SUMMARY MEDICAL DECISION MAKING FREE TEXT BOX
Given unclear story, will do CT head, C- spine, L-spine, CXR,  pelvic x-ray, and EKG to ensure no episode of syncope or arrythmia, anticipate will DC home Given unclear story, will perform CT head, C- spine, L-spine, CXR,  pelvic x-ray, and EKG to ensure no episode of syncope or arrythmia, anticipate will DC home

## 2021-08-13 NOTE — ED PROVIDER NOTE - OBJECTIVE STATEMENT
87 y/o male with a PMHx of dementia BIBA presents to ED s/p fall.  Pt was sent to ED after being found sitting on the floor next to his walker. Patient has no acute complaints. No evidence of trauma. Pt denies pain.

## 2021-08-13 NOTE — ED ADULT NURSE NOTE - NSIMPLEMENTINTERV_GEN_ALL_ED
Implemented All Fall with Harm Risk Interventions:  [place] to call system. Call bell, personal items and telephone within reach. Instruct patient to call for assistance. Room bathroom lighting operational. Non-slip footwear when patient is off stretcher. Physically safe environment: no spills, clutter or unnecessary equipment. Stretcher in lowest position, wheels locked, appropriate side rails in place. Provide visual cue, wrist band, yellow gown, etc. Monitor gait and stability. Monitor for mental status changes and reorient to person, place, and time. Review medications for side effects contributing to fall risk. Reinforce activity limits and safety measures with patient and family. Provide visual clues: red socks.
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
## 2021-08-13 NOTE — ED ADULT TRIAGE NOTE - CHIEF COMPLAINT QUOTE
pt biba s/p possible fall. Was found slumped over under a railing in the hallway. Pt states he fell. Denies complaints, no abrasions or bruises noted. Due to severe dementia, unable to determine whether pt fell.

## 2021-08-14 VITALS
TEMPERATURE: 98 F | RESPIRATION RATE: 18 BRPM | HEART RATE: 72 BPM | OXYGEN SATURATION: 95 % | DIASTOLIC BLOOD PRESSURE: 95 MMHG | SYSTOLIC BLOOD PRESSURE: 176 MMHG

## 2021-08-15 LAB
CULTURE RESULTS: SIGNIFICANT CHANGE UP
SPECIMEN SOURCE: SIGNIFICANT CHANGE UP

## 2021-09-27 ENCOUNTER — EMERGENCY (EMERGENCY)
Facility: HOSPITAL | Age: 86
LOS: 0 days | Discharge: HOME | End: 2021-09-28
Attending: EMERGENCY MEDICINE
Payer: MEDICARE

## 2021-09-27 VITALS
OXYGEN SATURATION: 95 % | RESPIRATION RATE: 16 BRPM | HEART RATE: 74 BPM | SYSTOLIC BLOOD PRESSURE: 124 MMHG | WEIGHT: 190.04 LBS | DIASTOLIC BLOOD PRESSURE: 78 MMHG | TEMPERATURE: 98 F | HEIGHT: 68 IN

## 2021-09-27 DIAGNOSIS — F03.90 UNSPECIFIED DEMENTIA WITHOUT BEHAVIORAL DISTURBANCE: ICD-10-CM

## 2021-09-27 DIAGNOSIS — R45.1 RESTLESSNESS AND AGITATION: ICD-10-CM

## 2021-09-27 LAB
APPEARANCE UR: CLEAR — SIGNIFICANT CHANGE UP
BILIRUB UR-MCNC: ABNORMAL
COLOR SPEC: YELLOW — SIGNIFICANT CHANGE UP
DIFF PNL FLD: NEGATIVE — SIGNIFICANT CHANGE UP
GLUCOSE UR QL: NEGATIVE MG/DL — SIGNIFICANT CHANGE UP
KETONES UR-MCNC: ABNORMAL
LEUKOCYTE ESTERASE UR-ACNC: ABNORMAL
NITRITE UR-MCNC: NEGATIVE — SIGNIFICANT CHANGE UP
PH UR: 5 — SIGNIFICANT CHANGE UP (ref 5–8)
PROT UR-MCNC: 30 MG/DL
SP GR SPEC: 1.02 — SIGNIFICANT CHANGE UP (ref 1.01–1.02)
UROBILINOGEN FLD QL: 4 MG/DL

## 2021-09-27 PROCEDURE — 99284 EMERGENCY DEPT VISIT MOD MDM: CPT

## 2021-09-27 PROCEDURE — 87086 URINE CULTURE/COLONY COUNT: CPT

## 2021-09-27 PROCEDURE — 71045 X-RAY EXAM CHEST 1 VIEW: CPT | Mod: 26

## 2021-09-27 PROCEDURE — 81001 URINALYSIS AUTO W/SCOPE: CPT

## 2021-09-27 PROCEDURE — 99283 EMERGENCY DEPT VISIT LOW MDM: CPT | Mod: 25

## 2021-09-27 PROCEDURE — 71045 X-RAY EXAM CHEST 1 VIEW: CPT
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
## 2021-09-27 NOTE — ED PROVIDER NOTE - PATIENT PORTAL LINK FT
You can access the FollowMyHealth Patient Portal offered by [facility] by registering at the following website: [email or web address]. By joining [facility]’s FollowMyHealth portal, you will also be able to view your health information using other applications (apps) compatible with our system.

## 2021-09-27 NOTE — ED PROVIDER NOTE - IV ALTEPLASE ADMIN OUTSIDE HIDDEN
Interval Hx: No acute events overnight. Per wife had a small yellow lesion on bottom that improved with frankincense and myrrh that she put on patient.      Review of Systems   Unable to perform ROS: Patient nonverbal     Objective:     Vital Signs (Most Recent):  Temp: 98.9 °F (37.2 °C) (03/03/18 0329)  Pulse: 88 (03/03/18 0819)  Resp: 18 (03/03/18 0819)  BP: (!) 127/91 (03/03/18 0329)  SpO2: 96 % (03/03/18 0819) Vital Signs (24h Range):  Temp:  [96.3 °F (35.7 °C)-98.9 °F (37.2 °C)] 98.9 °F (37.2 °C)  Pulse:  [] 88  Resp:  [14-20] 18  SpO2:  [94 %-100 %] 96 %  BP: (127-169)/() 127/91     Weight: 105.6 kg (232 lb 12.9 oz)  Body mass index is 33.4 kg/m².    Physical Exam   HENT:   Head: Normocephalic.   Eyes: EOM are normal. Pupils are equal, round, and reactive to light. No scleral icterus.   Neck: No JVD present. No tracheal deviation present.   Cardiovascular: Normal rate, regular rhythm and normal heart sounds.    No murmur heard.  Pulmonary/Chest: Effort normal and breath sounds normal. He has no wheezes. He has no rales.   Abdominal: Soft. There is no guarding.   Peg site with minimal purulent drainage, no erythema or induration appreciated   Musculoskeletal: He exhibits no edema.   contracturing in RUE   Neurological:   Alert, does not track or follow commands, left eye deviation, flaccid R. Paralysis, moving left side spontaneously  BS reflexes intact       Skin: Skin is warm. He is not diaphoretic.       Significant Labs:   ABGs:     Recent Labs  Lab 03/01/18  1006   PH 7.535*   PCO2 36.3   HCO3 30.7*   POCSATURATED 95   BE 8     Blood Culture: No results for input(s): LABBLOO in the last 48 hours.  CBC:     Recent Labs  Lab 03/02/18  0524 03/03/18  0523   WBC 7.43 7.83   HGB 12.1* 12.2*   HCT 35.1* 35.3*   * 493*     CMP:     Recent Labs  Lab 03/02/18  0524 03/03/18  0523    135*   K 3.3* 3.7   CL 98 98   CO2 28 26   * 233*   BUN 15 18   CREATININE 0.8 0.8   CALCIUM 9.1 9.4    PROT 7.2 7.0   ALBUMIN 2.4* 2.3*   BILITOT 0.5 0.5   ALKPHOS 133 126   AST 43* 48*   ALT 44 56*   ANIONGAP 10 11   EGFRNONAA >60.0 >60.0     Lactic Acid: No results for input(s): LACTATE in the last 48 hours.  Urine Culture: No results for input(s): LABURIN in the last 48 hours.    Significant Imaging: I have reviewed all pertinent imaging results/findings within the past 24 hours.   show
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
## 2021-09-28 VITALS
OXYGEN SATURATION: 96 % | TEMPERATURE: 98 F | RESPIRATION RATE: 18 BRPM | SYSTOLIC BLOOD PRESSURE: 119 MMHG | HEART RATE: 77 BPM | DIASTOLIC BLOOD PRESSURE: 80 MMHG

## 2021-09-29 LAB
CULTURE RESULTS: SIGNIFICANT CHANGE UP
SPECIMEN SOURCE: SIGNIFICANT CHANGE UP

## 2021-11-05 NOTE — ED PROVIDER NOTE - CCCP TRG CHIEF CMPLNT
fall Thalidomide Counseling: I discussed with the patient the risks of thalidomide including but not limited to birth defects, anxiety, weakness, chest pain, dizziness, cough and severe allergy.

## 2022-06-24 ENCOUNTER — INPATIENT (INPATIENT)
Facility: HOSPITAL | Age: 87
LOS: 6 days | Discharge: HOSPICE | DRG: 177 | End: 2022-07-01
Attending: HOSPITALIST | Admitting: HOSPITALIST
Payer: MEDICARE

## 2022-06-24 VITALS
HEART RATE: 77 BPM | WEIGHT: 197.09 LBS | TEMPERATURE: 99 F | HEIGHT: 68 IN | OXYGEN SATURATION: 91 % | RESPIRATION RATE: 15 BRPM | DIASTOLIC BLOOD PRESSURE: 115 MMHG | SYSTOLIC BLOOD PRESSURE: 136 MMHG

## 2022-06-24 DIAGNOSIS — J18.9 PNEUMONIA, UNSPECIFIED ORGANISM: ICD-10-CM

## 2022-06-24 LAB
ALBUMIN SERPL ELPH-MCNC: 2.9 G/DL — LOW (ref 3.3–5)
ALP SERPL-CCNC: 82 U/L — SIGNIFICANT CHANGE UP (ref 40–120)
ALT FLD-CCNC: 21 U/L — SIGNIFICANT CHANGE UP (ref 12–78)
ANION GAP SERPL CALC-SCNC: 5 MMOL/L — SIGNIFICANT CHANGE UP (ref 5–17)
APPEARANCE UR: CLEAR — SIGNIFICANT CHANGE UP
APTT BLD: 30.4 SEC — SIGNIFICANT CHANGE UP (ref 27.5–35.5)
AST SERPL-CCNC: 26 U/L — SIGNIFICANT CHANGE UP (ref 15–37)
BASE EXCESS BLDV CALC-SCNC: 3 MMOL/L — SIGNIFICANT CHANGE UP
BASOPHILS # BLD AUTO: 0.01 K/UL — SIGNIFICANT CHANGE UP (ref 0–0.2)
BASOPHILS NFR BLD AUTO: 0.1 % — SIGNIFICANT CHANGE UP (ref 0–2)
BILIRUB SERPL-MCNC: 2.7 MG/DL — HIGH (ref 0.2–1.2)
BILIRUB UR-MCNC: ABNORMAL
BUN SERPL-MCNC: 18 MG/DL — SIGNIFICANT CHANGE UP (ref 7–23)
CALCIUM SERPL-MCNC: 8.7 MG/DL — SIGNIFICANT CHANGE UP (ref 8.5–10.1)
CHLORIDE SERPL-SCNC: 109 MMOL/L — HIGH (ref 96–108)
CK SERPL-CCNC: 343 U/L — HIGH (ref 26–308)
CO2 BLDV-SCNC: 30 MMOL/L — HIGH (ref 22–26)
CO2 SERPL-SCNC: 27 MMOL/L — SIGNIFICANT CHANGE UP (ref 22–31)
COLOR SPEC: YELLOW — SIGNIFICANT CHANGE UP
CREAT SERPL-MCNC: 0.8 MG/DL — SIGNIFICANT CHANGE UP (ref 0.5–1.3)
DIFF PNL FLD: ABNORMAL
EGFR: 87 ML/MIN/1.73M2 — SIGNIFICANT CHANGE UP
EOSINOPHIL # BLD AUTO: 0 K/UL — SIGNIFICANT CHANGE UP (ref 0–0.5)
EOSINOPHIL NFR BLD AUTO: 0 % — SIGNIFICANT CHANGE UP (ref 0–6)
GAS PNL BLDV: SIGNIFICANT CHANGE UP
GLUCOSE SERPL-MCNC: 107 MG/DL — HIGH (ref 70–99)
GLUCOSE UR QL: NEGATIVE — SIGNIFICANT CHANGE UP
HCO3 BLDV-SCNC: 28 MMOL/L — SIGNIFICANT CHANGE UP (ref 22–29)
HCT VFR BLD CALC: 36.5 % — LOW (ref 39–50)
HGB BLD-MCNC: 11.8 G/DL — LOW (ref 13–17)
IMM GRANULOCYTES NFR BLD AUTO: 0.4 % — SIGNIFICANT CHANGE UP (ref 0–1.5)
INR BLD: 1.44 RATIO — HIGH (ref 0.88–1.16)
KETONES UR-MCNC: ABNORMAL
LACTATE SERPL-SCNC: 1 MMOL/L — SIGNIFICANT CHANGE UP (ref 0.7–2)
LEUKOCYTE ESTERASE UR-ACNC: ABNORMAL
LYMPHOCYTES # BLD AUTO: 0.63 K/UL — LOW (ref 1–3.3)
LYMPHOCYTES # BLD AUTO: 5.7 % — LOW (ref 13–44)
MCHC RBC-ENTMCNC: 31 PG — SIGNIFICANT CHANGE UP (ref 27–34)
MCHC RBC-ENTMCNC: 32.3 GM/DL — SIGNIFICANT CHANGE UP (ref 32–36)
MCV RBC AUTO: 95.8 FL — SIGNIFICANT CHANGE UP (ref 80–100)
MONOCYTES # BLD AUTO: 0.95 K/UL — HIGH (ref 0–0.9)
MONOCYTES NFR BLD AUTO: 8.6 % — SIGNIFICANT CHANGE UP (ref 2–14)
NEUTROPHILS # BLD AUTO: 9.42 K/UL — HIGH (ref 1.8–7.4)
NEUTROPHILS NFR BLD AUTO: 85.2 % — HIGH (ref 43–77)
NITRITE UR-MCNC: NEGATIVE — SIGNIFICANT CHANGE UP
PCO2 BLDV: 46 MMHG — SIGNIFICANT CHANGE UP (ref 42–55)
PH BLDV: 7.4 — SIGNIFICANT CHANGE UP (ref 7.32–7.43)
PH UR: 5 — SIGNIFICANT CHANGE UP (ref 5–8)
PLATELET # BLD AUTO: 134 K/UL — LOW (ref 150–400)
PO2 BLDV: 70 MMHG — SIGNIFICANT CHANGE UP
POTASSIUM SERPL-MCNC: 3.7 MMOL/L — SIGNIFICANT CHANGE UP (ref 3.5–5.3)
POTASSIUM SERPL-SCNC: 3.7 MMOL/L — SIGNIFICANT CHANGE UP (ref 3.5–5.3)
PROT SERPL-MCNC: 6.5 GM/DL — SIGNIFICANT CHANGE UP (ref 6–8.3)
PROT UR-MCNC: 30 MG/DL
PROTHROM AB SERPL-ACNC: 16.8 SEC — HIGH (ref 10.5–13.4)
RBC # BLD: 3.81 M/UL — LOW (ref 4.2–5.8)
RBC # FLD: 12.6 % — SIGNIFICANT CHANGE UP (ref 10.3–14.5)
SAO2 % BLDV: 94.5 % — SIGNIFICANT CHANGE UP
SODIUM SERPL-SCNC: 141 MMOL/L — SIGNIFICANT CHANGE UP (ref 135–145)
SP GR SPEC: 1.01 — SIGNIFICANT CHANGE UP (ref 1.01–1.02)
TROPONIN I, HIGH SENSITIVITY RESULT: 41.61 NG/L — SIGNIFICANT CHANGE UP
UROBILINOGEN FLD QL: 8
WBC # BLD: 11.05 K/UL — HIGH (ref 3.8–10.5)
WBC # FLD AUTO: 11.05 K/UL — HIGH (ref 3.8–10.5)

## 2022-06-24 PROCEDURE — U0003: CPT

## 2022-06-24 PROCEDURE — 99222 1ST HOSP IP/OBS MODERATE 55: CPT

## 2022-06-24 PROCEDURE — 71045 X-RAY EXAM CHEST 1 VIEW: CPT | Mod: 26

## 2022-06-24 PROCEDURE — 85027 COMPLETE CBC AUTOMATED: CPT

## 2022-06-24 PROCEDURE — 97163 PT EVAL HIGH COMPLEX 45 MIN: CPT | Mod: GP

## 2022-06-24 PROCEDURE — 83735 ASSAY OF MAGNESIUM: CPT

## 2022-06-24 PROCEDURE — 99282 EMERGENCY DEPT VISIT SF MDM: CPT

## 2022-06-24 PROCEDURE — 70450 CT HEAD/BRAIN W/O DYE: CPT | Mod: 26,MA

## 2022-06-24 PROCEDURE — 80053 COMPREHEN METABOLIC PANEL: CPT

## 2022-06-24 PROCEDURE — 99497 ADVNCD CARE PLAN 30 MIN: CPT | Mod: 25

## 2022-06-24 PROCEDURE — 36415 COLL VENOUS BLD VENIPUNCTURE: CPT

## 2022-06-24 PROCEDURE — U0005: CPT

## 2022-06-24 PROCEDURE — 93010 ELECTROCARDIOGRAM REPORT: CPT

## 2022-06-24 PROCEDURE — 83605 ASSAY OF LACTIC ACID: CPT

## 2022-06-24 PROCEDURE — 87040 BLOOD CULTURE FOR BACTERIA: CPT

## 2022-06-24 PROCEDURE — 99285 EMERGENCY DEPT VISIT HI MDM: CPT

## 2022-06-24 RX ORDER — SODIUM CHLORIDE 9 MG/ML
1000 INJECTION, SOLUTION INTRAVENOUS
Refills: 0 | Status: DISCONTINUED | OUTPATIENT
Start: 2022-06-24 | End: 2022-06-27

## 2022-06-24 RX ORDER — VANCOMYCIN HCL 1 G
1250 VIAL (EA) INTRAVENOUS ONCE
Refills: 0 | Status: COMPLETED | OUTPATIENT
Start: 2022-06-24 | End: 2022-06-24

## 2022-06-24 RX ORDER — CEFEPIME 1 G/1
2000 INJECTION, POWDER, FOR SOLUTION INTRAMUSCULAR; INTRAVENOUS EVERY 8 HOURS
Refills: 0 | Status: DISCONTINUED | OUTPATIENT
Start: 2022-06-24 | End: 2022-06-30

## 2022-06-24 RX ORDER — MEMANTINE HYDROCHLORIDE 10 MG/1
10 TABLET ORAL
Refills: 0 | Status: DISCONTINUED | OUTPATIENT
Start: 2022-06-24 | End: 2022-07-01

## 2022-06-24 RX ORDER — QUETIAPINE FUMARATE 200 MG/1
25 TABLET, FILM COATED ORAL DAILY
Refills: 0 | Status: DISCONTINUED | OUTPATIENT
Start: 2022-06-24 | End: 2022-07-01

## 2022-06-24 RX ORDER — LANOLIN ALCOHOL/MO/W.PET/CERES
3 CREAM (GRAM) TOPICAL AT BEDTIME
Refills: 0 | Status: DISCONTINUED | OUTPATIENT
Start: 2022-06-24 | End: 2022-07-01

## 2022-06-24 RX ORDER — CEFEPIME 1 G/1
2000 INJECTION, POWDER, FOR SOLUTION INTRAMUSCULAR; INTRAVENOUS EVERY 8 HOURS
Refills: 0 | Status: DISCONTINUED | OUTPATIENT
Start: 2022-06-24 | End: 2022-06-24

## 2022-06-24 RX ORDER — HALOPERIDOL DECANOATE 100 MG/ML
1 INJECTION INTRAMUSCULAR ONCE
Refills: 0 | Status: COMPLETED | OUTPATIENT
Start: 2022-06-24 | End: 2022-06-25

## 2022-06-24 RX ORDER — ONDANSETRON 8 MG/1
4 TABLET, FILM COATED ORAL EVERY 8 HOURS
Refills: 0 | Status: DISCONTINUED | OUTPATIENT
Start: 2022-06-24 | End: 2022-07-01

## 2022-06-24 RX ORDER — ENOXAPARIN SODIUM 100 MG/ML
40 INJECTION SUBCUTANEOUS EVERY 24 HOURS
Refills: 0 | Status: DISCONTINUED | OUTPATIENT
Start: 2022-06-24 | End: 2022-06-30

## 2022-06-24 RX ORDER — SENNA PLUS 8.6 MG/1
2 TABLET ORAL AT BEDTIME
Refills: 0 | Status: DISCONTINUED | OUTPATIENT
Start: 2022-06-24 | End: 2022-07-01

## 2022-06-24 RX ORDER — ACETAMINOPHEN 500 MG
650 TABLET ORAL EVERY 6 HOURS
Refills: 0 | Status: DISCONTINUED | OUTPATIENT
Start: 2022-06-24 | End: 2022-07-01

## 2022-06-24 RX ORDER — CEFEPIME 1 G/1
2000 INJECTION, POWDER, FOR SOLUTION INTRAMUSCULAR; INTRAVENOUS ONCE
Refills: 0 | Status: COMPLETED | OUTPATIENT
Start: 2022-06-24 | End: 2022-06-24

## 2022-06-24 RX ORDER — ESCITALOPRAM OXALATE 10 MG/1
10 TABLET, FILM COATED ORAL DAILY
Refills: 0 | Status: DISCONTINUED | OUTPATIENT
Start: 2022-06-24 | End: 2022-07-01

## 2022-06-24 RX ADMIN — CEFEPIME 100 MILLIGRAM(S): 1 INJECTION, POWDER, FOR SOLUTION INTRAMUSCULAR; INTRAVENOUS at 22:18

## 2022-06-24 RX ADMIN — SODIUM CHLORIDE 75 MILLILITER(S): 9 INJECTION, SOLUTION INTRAVENOUS at 18:55

## 2022-06-24 RX ADMIN — Medication 166.67 MILLIGRAM(S): at 13:29

## 2022-06-24 RX ADMIN — CEFEPIME 100 MILLIGRAM(S): 1 INJECTION, POWDER, FOR SOLUTION INTRAMUSCULAR; INTRAVENOUS at 13:08

## 2022-06-24 NOTE — PATIENT PROFILE ADULT - FUNCTIONAL ASSESSMENT - BASIC MOBILITY SECTION LABEL
#[phone] pt states she woke up with red blotches and itchy all over from the amoxicillin.    Pt is requesting something new to be called into [facility]
Identified patient 2 identifiers verified. Patient aware on new prescription.
Identified patient 2 identifiers verified. Patient started Amoxicillin yesterday, requesting another medication to be called in. Patient complaining of itching and rashes.
Z pack sent
.

## 2022-06-24 NOTE — H&P ADULT - ASSESSMENT
87/M, does not give any history,  with a PMHx of anxiety, dementia, major depressive disorder, was sent to the ED from [facility] for AMS reportedly starting 1.5 hours PTA. triage note states pt was difficult to arouse and difficulty with walking. Code stroke called in ED. Pt seen by neuro, doubt any CVA.     Found to have b/l PNA. COVID PCR awaited.     Pt admitted with ::    AMS/Metabolic encephalopathy  b/l PNA; r/o COVID PNA vs HCAP ; Gram neg Rods and resistant bacteria suspected  Mild Hypoxia / acute hypoxic respiratory failure  Dementia    PLAN:    admit to med floor  iv fluids  iv cefepime + vanco   ID consult  f/u blood cx  f/u COVID PCR  supportive O2    DVT PPX: lovenox   87/M, does not give any history,  with a PMHx of anxiety, dementia, major depressive disorder, was sent to the ED from [facility] for AMS reportedly starting 1.5 hours PTA. triage note states pt was difficult to arouse and difficulty with walking. Code stroke called in ED. Pt seen by neuro, doubt any CVA.     Found to have b/l PNA. COVID PCR awaited.     Pt admitted with ::    AMS/Metabolic encephalopathy  b/l PNA; r/o COVID PNA vs HCAP ; Gram neg Rods and resistant bacteria suspected  Mild Hypoxia / acute hypoxic respiratory failure  Dementia    PLAN:    admit to med floor  iv fluids  iv cefepime + vanco   ID consult  f/u blood cx  f/u COVID PCR  supportive O2    DVT PPX: lovenox    GOC and advance care planning meeting done with the patient's son, Mauro. He wishes his dad not to be resuscitated and intubated which would require mechanical ventilation. He is DNR/DNI. Additional time spent 19 min.    87/M, does not give any history,  with a PMHx of anxiety, dementia, major depressive disorder, was sent to the ED from [facility] for AMS reportedly starting 1.5 hours PTA. triage note states pt was difficult to arouse and difficulty with walking. Code stroke called in ED. Pt seen by neuro, doubt any CVA.     Found to have b/l PNA. COVID PCR negative    Pt admitted with ::    AMS/Metabolic encephalopathy  b/l PNA; no COVID PNA ;  HCAP ; Gram neg Rods and resistant bacteria suspected  Mild Hypoxia / acute hypoxic respiratory failure  Dementia    PLAN:    admit to med floor  iv fluids  iv cefepime + vanco   ID consult  f/u blood cx  f/u COVID PCR; neg  supportive O2    DVT PPX: lovenox    GOC and advance care planning meeting done with the patient's son, Mauro. He wishes his dad not to be resuscitated and intubated which would require mechanical ventilation. He is DNR/DNI. Additional time spent 19 min.    87/M, does not give any history,  with a PMHx of anxiety, dementia, major depressive disorder, was sent to the ED from [facility] for AMS reportedly starting 1.5 hours PTA. triage note states pt was difficult to arouse and difficulty with walking. Code stroke called in ED. Pt seen by neuro, doubt any CVA.     Found to have b/l PNA. COVID PCR negative    Pt admitted with ::    AMS/Metabolic encephalopathy  b/l PNA; no COVID PNA ;  HCAP ; Gram neg Rods and resistant bacteria suspected  Mild Hypoxia / acute hypoxic respiratory failure  Dementia    PLAN:    admit to med floor  iv fluids  iv cefepime + vanco   ID consult  f/u blood cx  f/u COVID PCR; neg  supportive O2  neuro consult appreciated; doubt tia/cva    DVT PPX: lovenox    GOC and advance care planning meeting done with the patient's son, Mauro. He wishes his dad not to be resuscitated and intubated which would require mechanical ventilation. He is DNR/DNI. Additional time spent 19 min.

## 2022-06-24 NOTE — ED ADULT NURSE NOTE - OBJECTIVE STATEMENT
pt arrives to ED sent from nursing home with unresponsiveness. pt normally confused at baseline secondary to dementia. Pt found to be unresponsive starting about 1.5 hr prior to arrival. Pt awake, able to follow simple commands upon arrival to ED. code stroke called.

## 2022-06-24 NOTE — H&P ADULT - NSHPLABSRESULTS_GEN_ALL_CORE
All Labs/EKG/Radiology/Meds reviewed by me.     Lab Results:  CBC  CBC Full  -  ( 2022 11:09 )  WBC Count : 11.05 K/uL  RBC Count : 3.81 M/uL  Hemoglobin : 11.8 g/dL  Hematocrit : 36.5 %  Platelet Count - Automated : 134 K/uL  Mean Cell Volume : 95.8 fl  Mean Cell Hemoglobin : 31.0 pg  Mean Cell Hemoglobin Concentration : 32.3 gm/dL  Auto Neutrophil # : 9.42 K/uL  Auto Lymphocyte # : 0.63 K/uL  Auto Monocyte # : 0.95 K/uL  Auto Eosinophil # : 0.00 K/uL  Auto Basophil # : 0.01 K/uL  Auto Neutrophil % : 85.2 %  Auto Lymphocyte % : 5.7 %  Auto Monocyte % : 8.6 %  Auto Eosinophil % : 0.0 %  Auto Basophil % : 0.1 %    .		Differential:	[] Automated		[] Manual  Chemistry                        11.8   11.05 )-----------( 134      ( 2022 11:09 )             36.5     06-24    141  |  109<H>  |  18  ----------------------------<  107<H>  3.7   |  27  |  0.80    Ca    8.7      2022 11:09    TPro  6.5  /  Alb  2.9<L>  /  TBili  2.7<H>  /  DBili  x   /  AST  26  /  ALT  21  /  AlkPhos  82  06-24    LIVER FUNCTIONS - ( 2022 11:09 )  Alb: 2.9 g/dL / Pro: 6.5 gm/dL / ALK PHOS: 82 U/L / ALT: 21 U/L / AST: 26 U/L / GGT: x           PT/INR - ( 2022 11:09 )   PT: 16.8 sec;   INR: 1.44 ratio         PTT - ( 2022 11:09 )  PTT:30.4 sec  Urinalysis Basic - ( 2022 12:32 )    Color: Yellow / Appearance: Clear / S.015 / pH: x  Gluc: x / Ketone: Moderate  / Bili: Small / Urobili: 8   Blood: x / Protein: 30 mg/dL / Nitrite: Negative   Leuk Esterase: Trace / RBC: 0-2 /HPF / WBC 3-5   Sq Epi: x / Non Sq Epi: Few / Bacteria: Occasional      RADIOLOGY RESULTS:    < from: Xray Chest 1 View AP/PA. (22 @ 11:41) >    Bilateral infiltrates right greater than left presently seen.   Prominent aorta again noted.    < end of copied text >    < from: CT Brain Stroke Protocol (22 @ 11:06) >    No significant change when allowing for differences in   technique.    MEDICATIONS  (STANDING):  busPIRone 15 milliGRAM(s) Oral two times a day  cefepime   IVPB 2000 milliGRAM(s) IV Intermittent every 8 hours  escitalopram 10 milliGRAM(s) Oral daily  memantine 10 milliGRAM(s) Oral two times a day  QUEtiapine 25 milliGRAM(s) Oral daily  senna 2 Tablet(s) Oral at bedtime    MEDICATIONS  (PRN):  acetaminophen     Tablet .. 650 milliGRAM(s) Oral every 6 hours PRN Temp greater or equal to 38C (100.4F), Mild Pain (1 - 3)  aluminum hydroxide/magnesium hydroxide/simethicone Suspension 30 milliLiter(s) Oral every 4 hours PRN Dyspepsia  melatonin 3 milliGRAM(s) Oral at bedtime PRN Insomnia  ondansetron Injectable 4 milliGRAM(s) IV Push every 8 hours PRN Nausea and/or Vomiting

## 2022-06-24 NOTE — ED PROVIDER NOTE - PHYSICAL EXAMINATION
Constitutional: NAD AAOx2  Eyes: PERRLA EOMI. Purulent discharge from right eye.   Head: Normocephalic atraumatic  Mouth: MMM  Cardiac: regular rate   Resp: Lungs CTAB  GI: Abd s/nt/nd  Neuro: CN2-12 intact. Pt requires repeat stimulation to arouse. A&Ox2. Mild drift to left arm.  2/5 strength b/l LE. NIH 8.   Skin: No visible rashes

## 2022-06-24 NOTE — ED ADULT NURSE REASSESSMENT NOTE - NS ED NURSE REASSESS COMMENT FT1
pt awake, following commands. diapers and linens changed. awaiting inpatient bed. in hallway in sight of nursing station in yellow gown.

## 2022-06-24 NOTE — ED ADULT TRIAGE NOTE - CHIEF COMPLAINT QUOTE
unresponsive episode starting 1.5 hours ago. hx of dementia. pt talking but confused and not following commands well. code stroke initiated.

## 2022-06-24 NOTE — ED ADULT NURSE NOTE - NSIMPLEMENTINTERV_GEN_ALL_ED
Implemented All Fall with Harm Risk Interventions:  [place] to call system. Call bell, personal items and telephone within reach. Instruct patient to call for assistance. Room bathroom lighting operational. Non-slip footwear when patient is off stretcher. Physically safe environment: no spills, clutter or unnecessary equipment. Stretcher in lowest position, wheels locked, appropriate side rails in place. Provide visual cue, wrist band, yellow gown, etc. Monitor gait and stability. Monitor for mental status changes and reorient to person, place, and time. Review medications for side effects contributing to fall risk. Reinforce activity limits and safety measures with patient and family. Provide visual clues: red socks.

## 2022-06-24 NOTE — ED PROVIDER NOTE - CLINICAL SUMMARY MEDICAL DECISION MAKING FREE TEXT BOX
86 y/o male with hx of dementia presents BIBA from facility for AMS and decreased responsiveness. Symptoms stared around 1.5 hours PTA. On arrival, code stroke called. Will obtain CT, neuro consult and admit. Pt symptoms are nonspecific and it is unclear if they represent a neuro event vs a metabolic event. Exam does not fit a specific neuro distribution so will need to discuss TPA with neurology prior to administration.

## 2022-06-24 NOTE — CONSULT NOTE ADULT - SUBJECTIVE AND OBJECTIVE BOX
Neurology Consult requested by:   Patient is a 87y old  Male who presents with a chief complaint of    HPI:  86 year old man transferred from NH, after reported episode of unresponsiveness. Hx of dementia. Patient doesn't give a hx. Denies pain.    PAST MEDICAL & SURGICAL HISTORY:  Dementia      No significant past surgical history        FAMILY HISTORY:    Social Hx:  Nonsmoker, no drug or alcohol use  Medications and Allergies ReviewedMEDICATIONS  (STANDING):     ROS: Pertinent positives in HPI, all other ROS were reviewed and are negative.      Examination:   Vital Signs Last 24 Hrs  T(C): 37.1 (24 Jun 2022 10:58), Max: 37.1 (24 Jun 2022 10:58)  T(F): 98.8 (24 Jun 2022 10:58), Max: 98.8 (24 Jun 2022 10:58)  HR: 77 (24 Jun 2022 10:58) (77 - 77)  BP: 137/91 (24 Jun 2022 11:09) (136/115 - 137/91)  RR: 15 (24 Jun 2022 10:58) (15 - 15)  SpO2: 91% (24 Jun 2022 10:58) (91% - 91%)  General: min cooperative, NAD   NECK: supple, no masses  ENT: grossly normal hearing   Vascular : no carotid bruits,   Lungs: CTAB  Chest: RRR, no murmurs  Extremities: nontender, no edema  Musculoskeletal: no adventitious movements, no joint stiffness  Skin: no rash    Neurological Examination:  NIHSS:  MS: lethargic, arousable, answers to name, knows he's in hospital, simple answers "yes , no". follows simple commands   CN: blinks to threat, PERLL, EOMI, V1-3 sensation intact, face symmetric, hearing grossly intact,  tongue midline, SCM equal bilaterally  Motor: normal bulk and tone, no tremor, rigidity or bradykinesia.  moves all extremities on command, strength > 3/5 all over   Sens: grossly Intact to light touch.    Reflexes: 0-1/4 all over, downgoing toes b/l  Coord:  Unable to test   Gait: Cannot test    Labs: Pending    < from: CT Brain Stroke Protocol (06.24.22 @ 11:06) >  INTERPRETATION:  Clinical indications: Code stroke.    Multiple axial sections were performed from base skull to vertex without   contrast enhancement. Coronal and sagittal reconstructions were performed   as well    This exam is compared prior head CT performed on August 13, 2021    Parenchymal volume loss and chronic microvascular ischemic changes are   again seen and unchanged.    Evaluation of the osseous structures with the appropriate window appears   normal    Right maxillary, bilateral ethmoid and left frontal sinus mucosal   thickening is seen.    Both mastoid and middle ear regions appear clear.    IMPRESSION: No significant change when allowing for differences in   technique.    < end of copied text >

## 2022-06-24 NOTE — ED PROVIDER NOTE - OBJECTIVE STATEMENT
86 y/o male with a PMHx of anxiety, dementia, major depressive disorder, presents to the ED BIBA from [facility] for AMS reportedly starting 1.5 hours PTA. 86 y/o male with a PMHx of anxiety, dementia, major depressive disorder, presents to the ED BIBA from [facility] for AMS reportedly starting 1.5 hours PTA. triage note states pt was difficult to arouse and difficulty with walking

## 2022-06-24 NOTE — H&P ADULT - NSHPPHYSICALEXAM_GEN_ALL_CORE
PHYSICAL EXAM:    Daily Height in cm: 172.72 (24 Jun 2022 10:58)    Daily     Vital Signs Last 24 Hrs  T(C): 37.2 (24 Jun 2022 13:30), Max: 37.2 (24 Jun 2022 13:30)  T(F): 98.9 (24 Jun 2022 13:30), Max: 98.9 (24 Jun 2022 13:30)  HR: 70 (24 Jun 2022 13:30) (68 - 80)  BP: 124/76 (24 Jun 2022 13:30) (123/83 - 137/91)  BP(mean): 88 (24 Jun 2022 13:30) (86 - 93)  RR: 21 (24 Jun 2022 13:30) (15 - 22)  SpO2: 95% (24 Jun 2022 13:30) (91% - 95%)    Constitutional: Weak and AMS  appearing  HEENT: Atraumatic, REBECA, Normal, No congestion  Respiratory: Breath Sounds normal, no rhonchi/wheeze  Cardiovascular: N S1S2;   Gastrointestinal: Abdomen soft, non tender, Bowel Sounds present  Extremities: No edema, peripheral pulses present  Neurological: Awake, not alert or oriented, does not talk  Skin: Non cellulitic, no rash, ulcers  Lymph Nodes: No lymphadenopathy noted  Back: No CVA tenderness   Musculoskeletal: non tender  Breasts: Deferred  Genitourinary: deferred  Rectal: Deferred

## 2022-06-24 NOTE — H&P ADULT - HISTORY OF PRESENT ILLNESS
87/M, does not give any history,  with a PMHx of anxiety, dementia, major depressive disorder, was sent to the ED from [facility] for AMS reportedly starting 1.5 hours PTA. triage note states pt was difficult to arouse and difficulty with walking. Code stroke called in ED. Pt seen by neuro, doubt any CVA.     Found to have b/l PNA. COVID PCR awaited.

## 2022-06-24 NOTE — PROGRESS NOTE ADULT - SUBJECTIVE AND OBJECTIVE BOX
RN called to report that Pt is severely agitated, spitting out all oral meds, hitting staff, aggressive. Not taking any PO meds 2/2 to agitation. Haldol 1 mg IM ordered for severe agitation.     D/w RN.

## 2022-06-24 NOTE — PATIENT PROFILE ADULT - FALL HARM RISK - HARM RISK INTERVENTIONS

## 2022-06-24 NOTE — ED PROVIDER NOTE - CARE PLAN
1 Principal Discharge DX:	Pneumonia  Secondary Diagnosis:	Hypoxia  Secondary Diagnosis:	Altered mental status

## 2022-06-24 NOTE — ED PROVIDER NOTE - NS_ ATTENDINGSCRIBEDETAILS _ED_A_ED_FT
I, Merritt Islas MD,  performed the initial face to face bedside interview with this patient regarding history of present illness, review of symptoms and relevant past medical, social and family history.  I completed an independent physical examination.  I was the initial provider who evaluated this patient.   I personally saw the patient and performed a substantive portion of the visit including all aspects of the medical decision making.  The history, relevant review of systems, past medical and surgical history, medical decision making, and physical examination was documented by the scribe in my presence and I attest to the accuracy of the documentation.

## 2022-06-24 NOTE — ED PROVIDER NOTE - IV ALTEPLASE INCLUSION HIDDEN
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  She was referred by Dr. Reyes for leg and musculoskeletal pain. In 2017 was on anastrazole which was changed to exemestane due to arthralgias.   Patient also with shoulder, head and neck pain which she reports is chronic, however more recently began having L lateral leg pain. Patient accompanied by her  today.  She denies inciting incident, reports she woke up with mild LLE pain a few weeks ago but it persisted, worsened the day it started but has been unchanged since then.  Pain is worse when standing from a seated position or sitting from a standing position. Pain is not bothering her at rest. At night she uses a pillow for positioning to be able to sleep comfortably.  Pain is described as sharp and dull, improves with rest and heat. Patient is also on valium which also helps her pain.   She reports pain is 8/10 today and limits ambulation. Patient ambulates without assistive device.  She denies recent falls. \par \par She is  and lives in a private house, reports no stairs inside but a few stairs outside.  \par  
show

## 2022-06-24 NOTE — ED PROVIDER NOTE - PROGRESS NOTE DETAILS
Ade Novoa for attending Dr. Islas: Dr. Chase at bedside evaluating pt. Ade Novoa for attending Dr. Islas: Spoke with radiology. CT unchanged from previous. Ade Novoa for attending Dr. Islas: Spoke with Dr. Chase. Agrees likely metabolic vs encephalopathy. Does not recommend TPA at this time. Pt to have medical workup and will be admitted. xray likely pneumonia - pt hyoxic to 90% on ra. abx ordered. pt endorsed to DR. Silver accepts.  Merritt Islas M.D., Attending Physician

## 2022-06-25 LAB
CULTURE RESULTS: NO GROWTH — SIGNIFICANT CHANGE UP
SPECIMEN SOURCE: SIGNIFICANT CHANGE UP

## 2022-06-25 PROCEDURE — 99232 SBSQ HOSP IP/OBS MODERATE 35: CPT

## 2022-06-25 RX ADMIN — CEFEPIME 100 MILLIGRAM(S): 1 INJECTION, POWDER, FOR SOLUTION INTRAMUSCULAR; INTRAVENOUS at 22:01

## 2022-06-25 RX ADMIN — MEMANTINE HYDROCHLORIDE 10 MILLIGRAM(S): 10 TABLET ORAL at 09:13

## 2022-06-25 RX ADMIN — MEMANTINE HYDROCHLORIDE 10 MILLIGRAM(S): 10 TABLET ORAL at 22:02

## 2022-06-25 RX ADMIN — ESCITALOPRAM OXALATE 10 MILLIGRAM(S): 10 TABLET, FILM COATED ORAL at 09:13

## 2022-06-25 RX ADMIN — CEFEPIME 100 MILLIGRAM(S): 1 INJECTION, POWDER, FOR SOLUTION INTRAMUSCULAR; INTRAVENOUS at 15:25

## 2022-06-25 RX ADMIN — SODIUM CHLORIDE 75 MILLILITER(S): 9 INJECTION, SOLUTION INTRAVENOUS at 22:01

## 2022-06-25 RX ADMIN — Medication 600 MILLIGRAM(S): at 22:02

## 2022-06-25 RX ADMIN — QUETIAPINE FUMARATE 25 MILLIGRAM(S): 200 TABLET, FILM COATED ORAL at 09:18

## 2022-06-25 RX ADMIN — SODIUM CHLORIDE 75 MILLILITER(S): 9 INJECTION, SOLUTION INTRAVENOUS at 09:26

## 2022-06-25 RX ADMIN — ENOXAPARIN SODIUM 40 MILLIGRAM(S): 100 INJECTION SUBCUTANEOUS at 09:13

## 2022-06-25 RX ADMIN — SENNA PLUS 2 TABLET(S): 8.6 TABLET ORAL at 22:03

## 2022-06-25 RX ADMIN — CEFEPIME 100 MILLIGRAM(S): 1 INJECTION, POWDER, FOR SOLUTION INTRAMUSCULAR; INTRAVENOUS at 05:59

## 2022-06-25 RX ADMIN — HALOPERIDOL DECANOATE 1 MILLIGRAM(S): 100 INJECTION INTRAMUSCULAR at 05:48

## 2022-06-25 RX ADMIN — Medication 15 MILLIGRAM(S): at 22:01

## 2022-06-25 RX ADMIN — Medication 15 MILLIGRAM(S): at 09:13

## 2022-06-25 RX ADMIN — Medication 600 MILLIGRAM(S): at 15:26

## 2022-06-25 RX ADMIN — Medication 3 MILLIGRAM(S): at 22:02

## 2022-06-25 NOTE — PROGRESS NOTE ADULT - SUBJECTIVE AND OBJECTIVE BOX
HPI: 87/M, does not give any history,  with a PMHx of anxiety, dementia, major depressive disorder, was sent to the ED from [facility] for AMS reportedly starting 1.5 hours PTA. triage note states pt was difficult to arouse and difficulty with walking. Code stroke called in ED. Pt seen by neuro, doubt any CVA.     Found to have b/l PNA. COVID PCR  neg    : pt still confused, removing O2 nc , was agitated overnight  has some cough and phlegm    PHYSICAL EXAM:    Daily     Daily     Vital Signs Last 24 Hrs  T(C): 36.9 (2022 08:42), Max: 37.3 (2022 22:00)  T(F): 98.4 (2022 08:42), Max: 99.1 (2022 22:00)  HR: 70 (2022 08:42) (70 - 83)  BP: 148/95 (2022 08:42) (124/76 - 151/79)  BP(mean): 104 (2022 22:00) (88 - 104)  RR: 19 (2022 08:42) (19 - 21)  SpO2: 90% (2022 08:42) (90% - 95%)    Constitutional: Weak  appearing  HEENT: Atraumatic, REBECA,   Respiratory: Breath Sounds normal, no rhonchi/wheeze  Cardiovascular: N S1S2;   Gastrointestinal: Abdomen soft, non tender, Bowel Sounds present  Extremities: No edema, peripheral pulses present  Neurological: AAO x 3, no gross focal motor deficits  Skin: Non cellulitic, no rash, ulcers  Lymph Nodes: No lymphadenopathy noted  Back: No CVA tenderness   Musculoskeletal: non tender  Breasts: Deferred  Genitourinary: deferred  Rectal: Deferred    All Labs/EKG/Radiology/Meds reviewed by me                          11.8    )-----------( 134      ( 2022 11:09 )             36.5       CBC Full  -  ( 2022 11:09 )  WBC Count : 11.05 K/uL  RBC Count : 3.81 M/uL  Hemoglobin : 11.8 g/dL  Hematocrit : 36.5 %  Platelet Count - Automated : 134 K/uL  Mean Cell Volume : 95.8 fl  Mean Cell Hemoglobin : 31.0 pg  Mean Cell Hemoglobin Concentration : 32.3 gm/dL  Auto Neutrophil # : 9.42 K/uL  Auto Lymphocyte # : 0.63 K/uL  Auto Monocyte # : 0.95 K/uL  Auto Eosinophil # : 0.00 K/uL  Auto Basophil # : 0.01 K/uL  Auto Neutrophil % : 85.2 %  Auto Lymphocyte % : 5.7 %  Auto Monocyte % : 8.6 %  Auto Eosinophil % : 0.0 %  Auto Basophil % : 0.1 %          141  |  109<H>  |  18  ----------------------------<  107<H>  3.7   |  27  |  0.80    Ca    8.7      2022 11:09    TPro  6.5  /  Alb  2.9<L>  /  TBili  2.7<H>  /  DBili  x   /  AST  26  /  ALT  21  /  AlkPhos  82  06-24      LIVER FUNCTIONS - ( 2022 11:09 )  Alb: 2.9 g/dL / Pro: 6.5 gm/dL / ALK PHOS: 82 U/L / ALT: 21 U/L / AST: 26 U/L / GGT: x             PT/INR - ( 2022 11:09 )   PT: 16.8 sec;   INR: 1.44 ratio         PTT - ( 2022 11:09 )  PTT:30.4 sec    CARDIAC MARKERS ( 2022 11:09 )  x     / x     / 343 U/L / x     / x            Urinalysis Basic - ( 2022 12:32 )    Color: Yellow / Appearance: Clear / S.015 / pH: x  Gluc: x / Ketone: Moderate  / Bili: Small / Urobili: 8   Blood: x / Protein: 30 mg/dL / Nitrite: Negative   Leuk Esterase: Trace / RBC: 0-2 /HPF / WBC 3-5   Sq Epi: x / Non Sq Epi: Few / Bacteria: Occasional            MEDICATIONS  (STANDING):  busPIRone 15 milliGRAM(s) Oral two times a day  cefepime   IVPB 2000 milliGRAM(s) IV Intermittent every 8 hours  dextrose 5% + sodium chloride 0.9%. 1000 milliLiter(s) (75 mL/Hr) IV Continuous <Continuous>  enoxaparin Injectable 40 milliGRAM(s) SubCutaneous every 24 hours  escitalopram 10 milliGRAM(s) Oral daily  guaiFENesin  milliGRAM(s) Oral every 12 hours  memantine 10 milliGRAM(s) Oral two times a day  QUEtiapine 25 milliGRAM(s) Oral daily  senna 2 Tablet(s) Oral at bedtime    MEDICATIONS  (PRN):  acetaminophen     Tablet .. 650 milliGRAM(s) Oral every 6 hours PRN Temp greater or equal to 38C (100.4F), Mild Pain (1 - 3)  aluminum hydroxide/magnesium hydroxide/simethicone Suspension 30 milliLiter(s) Oral every 4 hours PRN Dyspepsia  melatonin 3 milliGRAM(s) Oral at bedtime PRN Insomnia  ondansetron Injectable 4 milliGRAM(s) IV Push every 8 hours PRN Nausea and/or Vomiting

## 2022-06-25 NOTE — CONSULT NOTE ADULT - SUBJECTIVE AND OBJECTIVE BOX
Patient is a 87y old  Male who presents with a chief complaint of AMS, PNA (2022 13:10)    HPI:  87/M, with a PMHx of anxiety, dementia, major depressive disorder, admitted from sn[facility] on  for evaluation of change in mental status, not interacting; on imaging found to have pneumonia; patient unable to provide history and history per medical record.       PMH: as above  PSH: as above  Meds: per reconciliation sheet, noted below  MEDICATIONS  (STANDING):  busPIRone 15 milliGRAM(s) Oral two times a day  cefepime   IVPB 2000 milliGRAM(s) IV Intermittent every 8 hours  dextrose 5% + sodium chloride 0.9%. 1000 milliLiter(s) (75 mL/Hr) IV Continuous <Continuous>  enoxaparin Injectable 40 milliGRAM(s) SubCutaneous every 24 hours  escitalopram 10 milliGRAM(s) Oral daily  guaiFENesin  milliGRAM(s) Oral every 12 hours  memantine 10 milliGRAM(s) Oral two times a day  QUEtiapine 25 milliGRAM(s) Oral daily  senna 2 Tablet(s) Oral at bedtime    MEDICATIONS  (PRN):  acetaminophen     Tablet .. 650 milliGRAM(s) Oral every 6 hours PRN Temp greater or equal to 38C (100.4F), Mild Pain (1 - 3)  aluminum hydroxide/magnesium hydroxide/simethicone Suspension 30 milliLiter(s) Oral every 4 hours PRN Dyspepsia  melatonin 3 milliGRAM(s) Oral at bedtime PRN Insomnia  ondansetron Injectable 4 milliGRAM(s) IV Push every 8 hours PRN Nausea and/or Vomiting    Allergies    No Known Allergies    Intolerances      Social: no smoking, no alcohol, no illegal drugs; no recent travel, no exposure to TB  FAMILY HISTORY:  Family history unobtainable       ROS unable to obtain secondary to patient medical condition     Vital Signs Last 24 Hrs  T(C): 36.9 (2022 08:42), Max: 37.3 (2022 22:00)  T(F): 98.4 (2022 08:42), Max: 99.1 (2022 22:00)  HR: 70 (2022 08:42) (70 - 83)  BP: 148/95 (2022 08:42) (143/87 - 151/79)  BP(mean): 104 (2022 22:00) (98 - 104)  RR: 19 (2022 08:42) (19 - 20)  SpO2: 90% (2022 08:42) (90% - 95%)  Daily     Daily     PE:    Constitutional: frail looking  HEENT: NC/AT, EOMI, PERRLA, conjunctivae clear; ears and nose atraumatic; pharynx clear  Neck: supple; thyroid not palpable  Back: no tenderness  Respiratory: respiratory effort normal; diminished breath sounds  Cardiovascular: S1S2 regular, no murmurs  Abdomen: soft, not tender, not distended, positive BS; no liver or spleen organomegaly  Genitourinary: no suprapubic tenderness  Musculoskeletal: no muscle tenderness, no joint swelling or tenderness  Neurological/ Psychiatric: AxOx3, judgement and insight normal;  moving all extremities  Skin: no rashes; no palpable lesions    Labs: all available labs reviewed                        11.8   11.05 )-----------( 134      ( 2022 11:09 )             36.5     06-24    141  |  109<H>  |  18  ----------------------------<  107<H>  3.7   |  27  |  0.80    Ca    8.7      2022 11:09    TPro  6.5  /  Alb  2.9<L>  /  TBili  2.7<H>  /  DBili  x   /  AST  26  /  ALT  21  /  AlkPhos  82  06-24     LIVER FUNCTIONS - ( 2022 11:09 )  Alb: 2.9 g/dL / Pro: 6.5 gm/dL / ALK PHOS: 82 U/L / ALT: 21 U/L / AST: 26 U/L / GGT: x           Urinalysis Basic - ( 2022 12:32 )    Color: Yellow / Appearance: Clear / S.015 / pH: x  Gluc: x / Ketone: Moderate  / Bili: Small / Urobili: 8   Blood: x / Protein: 30 mg/dL / Nitrite: Negative   Leuk Esterase: Trace / RBC: 0-2 /HPF / WBC 3-5   Sq Epi: x / Non Sq Epi: Few / Bacteria: Occasional    < from: Xray Chest 1 View AP/PA. (22 @ 11:41) >  ACC: [id number] EXAM:  XR CHEST 1 VIEW                          PROCEDURE DATE:  2022          INTERPRETATION:  AP erect chest on 2022 at 11:27 AM. This is a   stroke code.    Heart possibly enlarged. 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    Present film shows infiltration in the right mid lower lung fields since   prior.    There may be developing left lower lung field infiltrate as well.    IMPRESSION: Bilateral infiltrates right greaterthan left presently seen.   Prominent aorta again noted.    < end of copied text >          Radiology: all available radiological tests reviewed    Advanced directives addressed: full resuscitation

## 2022-06-25 NOTE — PROGRESS NOTE ADULT - ASSESSMENT
87/M, does not give any history,  with a PMHx of anxiety, dementia, major depressive disorder, was sent to the ED from [facility] for AMS reportedly starting 1.5 hours PTA. triage note states pt was difficult to arouse and difficulty with walking. Code stroke called in ED. Pt seen by neuro, doubt any CVA.     Found to have b/l PNA. COVID PCR negative    Pt admitted with ::    AMS/Metabolic encephalopathy  b/l PNA; no COVID PNA ;  HCAP ; Gram neg Rods and resistant bacteria suspected  Mild Hypoxia / acute hypoxic respiratory failure  Dementia    PLAN:    admit to med floor  iv fluids  iv cefepime + vanco   ID consult  f/u blood cx  f/u COVID PCR; neg  supportive O2  neuro consult appreciated; doubt tia/cva    DVT PPX: lovenox    GOC and advance care planning meeting done with the patient's son, Mauro. He wishes his dad not to be resuscitated and intubated which would require mechanical ventilation. He is DNR/DNI.  87/M, does not give any history,  with a PMHx of anxiety, dementia, major depressive disorder, was sent to the ED from [facility] for AMS reportedly starting 1.5 hours PTA. triage note states pt was difficult to arouse and difficulty with walking. Code stroke called in ED. Pt seen by neuro, doubt any CVA.     Found to have b/l PNA. COVID PCR negative    Pt admitted with ::    AMS/Metabolic encephalopathy  b/l PNA; no COVID PNA ;  HCAP ; Gram neg Rods and resistant bacteria suspected  Mild Hypoxia / acute hypoxic respiratory failure  Dementia  productive cough     PLAN:    admit to med floor  iv fluids  iv cefepime + vanco   ID consult  f/u blood cx  f/u COVID PCR; neg  supportive O2  neuro consult appreciated; doubt tia/cva  add Mucinex     DVT PPX: lovenox    GOC and advance care planning meeting done with the patient's son, Mauro. He wishes his dad not to be resuscitated and intubated which would require mechanical ventilation. He is DNR/DNI.

## 2022-06-26 PROCEDURE — 99232 SBSQ HOSP IP/OBS MODERATE 35: CPT

## 2022-06-26 RX ORDER — CIPROFLOXACIN HCL 0.3 %
1 DROPS OPHTHALMIC (EYE)
Refills: 0 | Status: DISCONTINUED | OUTPATIENT
Start: 2022-06-26 | End: 2022-06-28

## 2022-06-26 RX ADMIN — Medication 1 DROP(S): at 15:47

## 2022-06-26 RX ADMIN — QUETIAPINE FUMARATE 25 MILLIGRAM(S): 200 TABLET, FILM COATED ORAL at 09:01

## 2022-06-26 RX ADMIN — SENNA PLUS 2 TABLET(S): 8.6 TABLET ORAL at 21:31

## 2022-06-26 RX ADMIN — ESCITALOPRAM OXALATE 10 MILLIGRAM(S): 10 TABLET, FILM COATED ORAL at 09:01

## 2022-06-26 RX ADMIN — MEMANTINE HYDROCHLORIDE 10 MILLIGRAM(S): 10 TABLET ORAL at 09:00

## 2022-06-26 RX ADMIN — Medication 1 DROP(S): at 21:32

## 2022-06-26 RX ADMIN — ENOXAPARIN SODIUM 40 MILLIGRAM(S): 100 INJECTION SUBCUTANEOUS at 09:00

## 2022-06-26 RX ADMIN — Medication 600 MILLIGRAM(S): at 09:00

## 2022-06-26 RX ADMIN — Medication 15 MILLIGRAM(S): at 09:01

## 2022-06-26 RX ADMIN — Medication 1 DROP(S): at 19:00

## 2022-06-26 RX ADMIN — SODIUM CHLORIDE 75 MILLILITER(S): 9 INJECTION, SOLUTION INTRAVENOUS at 16:00

## 2022-06-26 RX ADMIN — Medication 1 DROP(S): at 12:04

## 2022-06-26 RX ADMIN — CEFEPIME 100 MILLIGRAM(S): 1 INJECTION, POWDER, FOR SOLUTION INTRAMUSCULAR; INTRAVENOUS at 21:30

## 2022-06-26 RX ADMIN — Medication 1 DROP(S): at 14:05

## 2022-06-26 RX ADMIN — MEMANTINE HYDROCHLORIDE 10 MILLIGRAM(S): 10 TABLET ORAL at 21:30

## 2022-06-26 RX ADMIN — CEFEPIME 100 MILLIGRAM(S): 1 INJECTION, POWDER, FOR SOLUTION INTRAMUSCULAR; INTRAVENOUS at 13:01

## 2022-06-26 RX ADMIN — Medication 3 MILLIGRAM(S): at 21:30

## 2022-06-26 RX ADMIN — Medication 1 DROP(S): at 20:19

## 2022-06-26 RX ADMIN — CEFEPIME 100 MILLIGRAM(S): 1 INJECTION, POWDER, FOR SOLUTION INTRAMUSCULAR; INTRAVENOUS at 05:11

## 2022-06-26 RX ADMIN — Medication 15 MILLIGRAM(S): at 21:30

## 2022-06-26 RX ADMIN — Medication 600 MILLIGRAM(S): at 21:31

## 2022-06-26 NOTE — PROGRESS NOTE ADULT - ASSESSMENT
87/M, with a PMHx of anxiety, dementia, major depressive disorder, admitted from snf on 6/24 for evaluation of change in mental status, not interacting; on imaging found to have pneumonia; patient unable to provide history and history per medical record.     1. Patient admitted with change in mental status, noted with pneumonia on admission; patient at risk for gram negative rods and other resistant bacteria   - follow up cultures   - serial cbc and monitor temperature   - reviewed prior medical records to evaluate for resistant or atypical pathogens   - oxygen and nebs as needed   - day #2-3 cefepime  - tolerating antibiotics without rashes or side effects   - consideration for palliative evaluation  2. other issues: per medicine

## 2022-06-26 NOTE — PROGRESS NOTE ADULT - SUBJECTIVE AND OBJECTIVE BOX
Date of service: 06-26-22 @ 12:28      Patient lying in bed; afebrile, eating breakfast, awake, alert      ROS unable to obtain secondary to patient medical condition     MEDICATIONS  (STANDING):  busPIRone 15 milliGRAM(s) Oral two times a day  cefepime   IVPB 2000 milliGRAM(s) IV Intermittent every 8 hours  ciprofloxacin  0.3% Ophthalmic Solution 1 Drop(s) Both EYES every 2 hours  dextrose 5% + sodium chloride 0.9%. 1000 milliLiter(s) (75 mL/Hr) IV Continuous <Continuous>  enoxaparin Injectable 40 milliGRAM(s) SubCutaneous every 24 hours  escitalopram 10 milliGRAM(s) Oral daily  guaiFENesin  milliGRAM(s) Oral every 12 hours  memantine 10 milliGRAM(s) Oral two times a day  QUEtiapine 25 milliGRAM(s) Oral daily  senna 2 Tablet(s) Oral at bedtime    MEDICATIONS  (PRN):  acetaminophen     Tablet .. 650 milliGRAM(s) Oral every 6 hours PRN Temp greater or equal to 38C (100.4F), Mild Pain (1 - 3)  aluminum hydroxide/magnesium hydroxide/simethicone Suspension 30 milliLiter(s) Oral every 4 hours PRN Dyspepsia  melatonin 3 milliGRAM(s) Oral at bedtime PRN Insomnia  ondansetron Injectable 4 milliGRAM(s) IV Push every 8 hours PRN Nausea and/or Vomiting      Vital Signs Last 24 Hrs  T(C): 36.2 (26 Jun 2022 08:07), Max: 37.2 (25 Jun 2022 16:50)  T(F): 97.2 (26 Jun 2022 08:07), Max: 98.9 (25 Jun 2022 16:50)  HR: 57 (26 Jun 2022 08:07) (57 - 72)  BP: 140/89 (26 Jun 2022 08:07) (132/80 - 142/87)  BP(mean): --  RR: 18 (26 Jun 2022 08:07) (18 - 18)  SpO2: 96% (26 Jun 2022 08:07) (96% - 96%)        Physical Exam:        Constitutional: frail looking  HEENT: NC/AT, EOMI, PERRLA, conjunctivae clear; ears and nose atraumatic; pharynx clear  Neck: supple; thyroid not palpable  Back: no tenderness  Respiratory: respiratory effort normal; diminished breath sounds  Cardiovascular: S1S2 regular, no murmurs  Abdomen: soft, not tender, not distended, positive BS; no liver or spleen organomegaly  Genitourinary: no suprapubic tenderness  Musculoskeletal: no muscle tenderness, no joint swelling or tenderness  Neurological/ Psychiatric:   moving all extremities  Skin: no rashes; no palpable lesions    Labs: all available labs reviewed                       Labs:                 Cultures:       Culture - Blood (collected 06-24-22 @ 13:03)  Source: .Blood None  Preliminary Report (06-25-22 @ 19:02):    No growth to date.    Culture - Blood (collected 06-24-22 @ 13:03)  Source: .Blood None  Preliminary Report (06-25-22 @ 19:02):    No growth to date.    Culture - Urine (collected 06-24-22 @ 12:32)  Source: Catheterized None  Final Report (06-25-22 @ 17:24):    No growth              < from: Xray Chest 1 View AP/PA. (06.24.22 @ 11:41) >  ACC: [id number] EXAM:  XR CHEST 1 VIEW                          PROCEDURE DATE:  06/24/2022          INTERPRETATION:  AP erect chest on June 24, 2022 at 11:27 AM. This is a   stroke code.    Heart possibly enlarged. 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    Present film shows infiltration in the right mid lower lung fields since   prior.    There may be developing left lower lung field infiltrate as well.    IMPRESSION: Bilateral infiltrates right greaterthan left presently seen.   Prominent aorta again noted.    < end of copied text >          Radiology: all available radiological tests reviewed    Advanced directives addressed: full resuscitation

## 2022-06-26 NOTE — PROGRESS NOTE ADULT - ASSESSMENT
87/M, does not give any history,  with a PMHx of anxiety, dementia, major depressive disorder, was sent to the ED from [facility] for AMS reportedly starting 1.5 hours PTA. triage note states pt was difficult to arouse and difficulty with walking. Code stroke called in ED. Pt seen by neuro, doubt any CVA.     Found to have b/l PNA. COVID PCR negative    Pt admitted with ::    AMS/Metabolic encephalopathy  b/l PNA; no COVID PNA ;  HCAP ; Gram neg Rods and resistant bacteria suspected  Mild Hypoxia / acute hypoxic respiratory failure  Dementia  productive cough   conjunctivitis b/l     PLAN:    admit to med floor  iv fluids  iv cefepime + vanco   ID consult  f/u blood cx  f/u COVID PCR; neg  supportive O2  neuro consult appreciated; doubt tia/cva  add Mucinex   cipro eye drops      DVT PPX: lovenox    GOC and advance care planning meeting done with the patient's son, Mauro. He wishes his dad not to be resuscitated and intubated which would require mechanical ventilation. He is DNR/DNI.

## 2022-06-26 NOTE — PROGRESS NOTE ADULT - SUBJECTIVE AND OBJECTIVE BOX
HPI: 87/M, does not give any history,  with a PMHx of anxiety, dementia, major depressive disorder, was sent to the ED from [facility] for AMS reportedly starting 1.5 hours PTA. triage note states pt was difficult to arouse and difficulty with walking. Code stroke called in ED. Pt seen by neuro, doubt any CVA.     Found to have b/l PNA. COVID PCR  neg    : pt still confused, removing O2 nc , was agitated overnight  has some cough and phlegm    : pink eyes b/l with discharge  cipro drops started    PHYSICAL EXAM:    Daily     Daily     Vital Signs Last 24 Hrs  T(C): 36.2 (2022 08:07), Max: 37.2 (2022 16:50)  T(F): 97.2 (2022 08:07), Max: 98.9 (2022 16:50)  HR: 57 (2022 08:07) (57 - 72)  BP: 140/89 (2022 08:07) (132/80 - 142/87)  BP(mean): --  RR: 18 (2022 08:07) (18 - 18)  SpO2: 96% (2022 08:07) (96% - 96%)    Constitutional: Weak  appearing  HEENT: Atraumatic, REBECA,   Respiratory: Breath Sounds normal, no rhonchi/wheeze  Cardiovascular: N S1S2;   Gastrointestinal: Abdomen soft, non tender, Bowel Sounds present  Extremities: No edema, peripheral pulses present  Neurological: Arousable, not much alert or oriented  Skin: Non cellulitic, no rash, ulcers  Lymph Nodes: No lymphadenopathy noted  Back: No CVA tenderness   Musculoskeletal: non tender  Breasts: Deferred  Genitourinary: deferred  Rectal: Deferred    All Labs/EKG/Radiology/Meds reviewed by me                          11.8    )-----------( 134      ( 2022 11:09 )             36.5       CBC Full  -  ( 2022 11:09 )  WBC Count : 11.05 K/uL  RBC Count : 3.81 M/uL  Hemoglobin : 11.8 g/dL  Hematocrit : 36.5 %  Platelet Count - Automated : 134 K/uL  Mean Cell Volume : 95.8 fl  Mean Cell Hemoglobin : 31.0 pg  Mean Cell Hemoglobin Concentration : 32.3 gm/dL  Auto Neutrophil # : 9.42 K/uL  Auto Lymphocyte # : 0.63 K/uL  Auto Monocyte # : 0.95 K/uL  Auto Eosinophil # : 0.00 K/uL  Auto Basophil # : 0.01 K/uL  Auto Neutrophil % : 85.2 %  Auto Lymphocyte % : 5.7 %  Auto Monocyte % : 8.6 %  Auto Eosinophil % : 0.0 %  Auto Basophil % : 0.1 %          141  |  109<H>  |  18  ----------------------------<  107<H>  3.7   |  27  |  0.80    Ca    8.7      2022 11:09    TPro  6.5  /  Alb  2.9<L>  /  TBili  2.7<H>  /  DBili  x   /  AST  26  /  ALT  21  /  AlkPhos  82  06-24      LIVER FUNCTIONS - ( 2022 11:09 )  Alb: 2.9 g/dL / Pro: 6.5 gm/dL / ALK PHOS: 82 U/L / ALT: 21 U/L / AST: 26 U/L / GGT: x             PT/INR - ( 2022 11:09 )   PT: 16.8 sec;   INR: 1.44 ratio         PTT - ( 2022 11:09 )  PTT:30.4 sec    CARDIAC MARKERS ( 2022 11:09 )  x     / x     / 343 U/L / x     / x            Urinalysis Basic - ( 2022 12:32 )    Color: Yellow / Appearance: Clear / S.015 / pH: x  Gluc: x / Ketone: Moderate  / Bili: Small / Urobili: 8   Blood: x / Protein: 30 mg/dL / Nitrite: Negative   Leuk Esterase: Trace / RBC: 0-2 /HPF / WBC 3-5   Sq Epi: x / Non Sq Epi: Few / Bacteria: Occasional      MEDICATIONS  (STANDING):  busPIRone 15 milliGRAM(s) Oral two times a day  cefepime   IVPB 2000 milliGRAM(s) IV Intermittent every 8 hours  ciprofloxacin  0.3% Ophthalmic Solution 1 Drop(s) Both EYES every 2 hours  dextrose 5% + sodium chloride 0.9%. 1000 milliLiter(s) (75 mL/Hr) IV Continuous <Continuous>  enoxaparin Injectable 40 milliGRAM(s) SubCutaneous every 24 hours  escitalopram 10 milliGRAM(s) Oral daily  guaiFENesin  milliGRAM(s) Oral every 12 hours  memantine 10 milliGRAM(s) Oral two times a day  QUEtiapine 25 milliGRAM(s) Oral daily  senna 2 Tablet(s) Oral at bedtime    MEDICATIONS  (PRN):  acetaminophen     Tablet .. 650 milliGRAM(s) Oral every 6 hours PRN Temp greater or equal to 38C (100.4F), Mild Pain (1 - 3)  aluminum hydroxide/magnesium hydroxide/simethicone Suspension 30 milliLiter(s) Oral every 4 hours PRN Dyspepsia  melatonin 3 milliGRAM(s) Oral at bedtime PRN Insomnia  ondansetron Injectable 4 milliGRAM(s) IV Push every 8 hours PRN Nausea and/or Vomiting

## 2022-06-27 PROCEDURE — 99232 SBSQ HOSP IP/OBS MODERATE 35: CPT

## 2022-06-27 RX ADMIN — MEMANTINE HYDROCHLORIDE 10 MILLIGRAM(S): 10 TABLET ORAL at 22:25

## 2022-06-27 RX ADMIN — Medication 1 DROP(S): at 18:50

## 2022-06-27 RX ADMIN — Medication 1 DROP(S): at 01:47

## 2022-06-27 RX ADMIN — Medication 1 DROP(S): at 10:00

## 2022-06-27 RX ADMIN — Medication 1 DROP(S): at 05:29

## 2022-06-27 RX ADMIN — Medication 15 MILLIGRAM(S): at 09:59

## 2022-06-27 RX ADMIN — Medication 600 MILLIGRAM(S): at 10:00

## 2022-06-27 RX ADMIN — MEMANTINE HYDROCHLORIDE 10 MILLIGRAM(S): 10 TABLET ORAL at 09:58

## 2022-06-27 RX ADMIN — ESCITALOPRAM OXALATE 10 MILLIGRAM(S): 10 TABLET, FILM COATED ORAL at 09:58

## 2022-06-27 RX ADMIN — CEFEPIME 100 MILLIGRAM(S): 1 INJECTION, POWDER, FOR SOLUTION INTRAMUSCULAR; INTRAVENOUS at 05:25

## 2022-06-27 RX ADMIN — Medication 15 MILLIGRAM(S): at 22:25

## 2022-06-27 RX ADMIN — Medication 3 MILLIGRAM(S): at 22:25

## 2022-06-27 RX ADMIN — SENNA PLUS 2 TABLET(S): 8.6 TABLET ORAL at 22:25

## 2022-06-27 RX ADMIN — Medication 1 DROP(S): at 04:07

## 2022-06-27 RX ADMIN — Medication 1 DROP(S): at 04:47

## 2022-06-27 RX ADMIN — SODIUM CHLORIDE 75 MILLILITER(S): 9 INJECTION, SOLUTION INTRAVENOUS at 05:24

## 2022-06-27 RX ADMIN — CEFEPIME 100 MILLIGRAM(S): 1 INJECTION, POWDER, FOR SOLUTION INTRAMUSCULAR; INTRAVENOUS at 15:10

## 2022-06-27 RX ADMIN — Medication 1 DROP(S): at 07:38

## 2022-06-27 RX ADMIN — Medication 1 DROP(S): at 15:10

## 2022-06-27 RX ADMIN — Medication 1 DROP(S): at 16:51

## 2022-06-27 RX ADMIN — ENOXAPARIN SODIUM 40 MILLIGRAM(S): 100 INJECTION SUBCUTANEOUS at 09:59

## 2022-06-27 RX ADMIN — CEFEPIME 100 MILLIGRAM(S): 1 INJECTION, POWDER, FOR SOLUTION INTRAMUSCULAR; INTRAVENOUS at 22:25

## 2022-06-27 RX ADMIN — Medication 1 DROP(S): at 23:34

## 2022-06-27 RX ADMIN — Medication 1 DROP(S): at 11:34

## 2022-06-27 RX ADMIN — QUETIAPINE FUMARATE 25 MILLIGRAM(S): 200 TABLET, FILM COATED ORAL at 09:59

## 2022-06-27 RX ADMIN — Medication 1 DROP(S): at 22:24

## 2022-06-27 NOTE — PROGRESS NOTE ADULT - SUBJECTIVE AND OBJECTIVE BOX
Date of service: 06-27-22 @ 11:35      Patient lying in bed; afebrile, mildly confused, ate his breakfast earlier    ROS unable to obtain secondary to patient medical condition     MEDICATIONS  (STANDING):  busPIRone 15 milliGRAM(s) Oral two times a day  cefepime   IVPB 2000 milliGRAM(s) IV Intermittent every 8 hours  ciprofloxacin  0.3% Ophthalmic Solution 1 Drop(s) Both EYES every 2 hours  dextrose 5% + sodium chloride 0.9%. 1000 milliLiter(s) (75 mL/Hr) IV Continuous <Continuous>  enoxaparin Injectable 40 milliGRAM(s) SubCutaneous every 24 hours  escitalopram 10 milliGRAM(s) Oral daily  guaiFENesin  milliGRAM(s) Oral every 12 hours  memantine 10 milliGRAM(s) Oral two times a day  QUEtiapine 25 milliGRAM(s) Oral daily  senna 2 Tablet(s) Oral at bedtime    MEDICATIONS  (PRN):  acetaminophen     Tablet .. 650 milliGRAM(s) Oral every 6 hours PRN Temp greater or equal to 38C (100.4F), Mild Pain (1 - 3)  aluminum hydroxide/magnesium hydroxide/simethicone Suspension 30 milliLiter(s) Oral every 4 hours PRN Dyspepsia  melatonin 3 milliGRAM(s) Oral at bedtime PRN Insomnia  ondansetron Injectable 4 milliGRAM(s) IV Push every 8 hours PRN Nausea and/or Vomiting      Vital Signs Last 24 Hrs  T(C): 36.8 (27 Jun 2022 09:10), Max: 36.8 (27 Jun 2022 09:10)  T(F): 98.3 (27 Jun 2022 09:10), Max: 98.3 (27 Jun 2022 09:10)  HR: 73 (27 Jun 2022 09:10) (66 - 73)  BP: 145/82 (27 Jun 2022 09:10) (145/80 - 153/85)  BP(mean): --  RR: 18 (27 Jun 2022 09:10) (16 - 18)  SpO2: 95% (27 Jun 2022 09:10) (93% - 96%)        Physical Exam:        Constitutional: frail looking  HEENT: NC/AT, EOMI, PERRLA, conjunctivae clear; ears and nose atraumatic; pharynx clear  Neck: supple; thyroid not palpable  Back: no tenderness  Respiratory: respiratory effort normal; diminished breath sounds  Cardiovascular: S1S2 regular, no murmurs  Abdomen: soft, not tender, not distended, positive BS; no liver or spleen organomegaly  Genitourinary: no suprapubic tenderness  Musculoskeletal: no muscle tenderness, no joint swelling or tenderness  Neurological/ Psychiatric:   moving all extremities  Skin: no rashes; no palpable lesions    Labs: all available labs reviewed                       Labs:               Labs:                 Cultures:       Culture - Blood (collected 06-24-22 @ 13:03)  Source: .Blood None  Preliminary Report (06-25-22 @ 19:02):    No growth to date.    Culture - Blood (collected 06-24-22 @ 13:03)  Source: .Blood None  Preliminary Report (06-25-22 @ 19:02):    No growth to date.    Culture - Urine (collected 06-24-22 @ 12:32)  Source: Catheterized None  Final Report (06-25-22 @ 17:24):    No growth            < from: Xray Chest 1 View AP/PA. (06.24.22 @ 11:41) >  ACC: [id number] EXAM:  XR CHEST 1 VIEW                          PROCEDURE DATE:  06/24/2022          INTERPRETATION:  AP erect chest on June 24, 2022 at 11:27 AM. This is a   stroke code.    Heart possibly enlarged. 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    Present film shows infiltration in the right mid lower lung fields since   prior.    There may be developing left lower lung field infiltrate as well.    IMPRESSION: Bilateral infiltrates right greaterthan left presently seen.   Prominent aorta again noted.    < end of copied text >          Radiology: all available radiological tests reviewed    Advanced directives addressed: full resuscitation

## 2022-06-27 NOTE — PROGRESS NOTE ADULT - SUBJECTIVE AND OBJECTIVE BOX
HPI: 87/M, does not give any history,  with a PMHx of anxiety, dementia, major depressive disorder, was sent to the ED from [facility] for AMS reportedly starting 1.5 hours PTA. triage note states pt was difficult to arouse and difficulty with walking. Code stroke called in ED. Pt seen by neuro, doubt any CVA.     Found to have b/l PNA. COVID PCR  neg    : pt still confused, removing O2 nc , was agitated overnight  has some cough and phlegm    : pink eyes b/l with discharge  cipro drops started    : condition same  cont iv cefepime  minimally interactive  Palliative care consult    PHYSICAL EXAM:    Daily     Daily     Vital Signs Last 24 Hrs  T(C): 36.9 (2022 15:30), Max: 36.9 (2022 15:30)  T(F): 98.4 (2022 15:30), Max: 98.4 (2022 15:30)  HR: 70 (2022 15:30) (68 - 73)  BP: 140/80 (2022 15:30) (140/80 - 145/82)  BP(mean): --  RR: 18 (2022 15:30) (16 - 18)  SpO2: 95% (2022 15:30) (95% - 96%)    Constitutional: Weak  appearing  HEENT: Atraumatic, REBECA,   Respiratory: Breath Sounds normal, no rhonchi/wheeze  Cardiovascular: N S1S2;   Gastrointestinal: Abdomen soft, non tender, Bowel Sounds present  Extremities: No edema, peripheral pulses present  Neurological: Arousable, not much alert or oriented  Skin: Non cellulitic, no rash, ulcers  Lymph Nodes: No lymphadenopathy noted  Back: No CVA tenderness   Musculoskeletal: non tender  Breasts: Deferred  Genitourinary: deferred  Rectal: Deferred    All Labs/EKG/Radiology/Meds reviewed by me                          11.8   11 )-----------( 134      ( 2022 11:09 )             36.5       CBC Full  -  ( 2022 11:09 )  WBC Count : 11.05 K/uL  RBC Count : 3.81 M/uL  Hemoglobin : 11.8 g/dL  Hematocrit : 36.5 %  Platelet Count - Automated : 134 K/uL  Mean Cell Volume : 95.8 fl  Mean Cell Hemoglobin : 31.0 pg  Mean Cell Hemoglobin Concentration : 32.3 gm/dL  Auto Neutrophil # : 9.42 K/uL  Auto Lymphocyte # : 0.63 K/uL  Auto Monocyte # : 0.95 K/uL  Auto Eosinophil # : 0.00 K/uL  Auto Basophil # : 0.01 K/uL  Auto Neutrophil % : 85.2 %  Auto Lymphocyte % : 5.7 %  Auto Monocyte % : 8.6 %  Auto Eosinophil % : 0.0 %  Auto Basophil % : 0.1 %          141  |  109<H>  |  18  ----------------------------<  107<H>  3.7   |  27  |  0.80    Ca    8.7      2022 11:09    TPro  6.5  /  Alb  2.9<L>  /  TBili  2.7<H>  /  DBili  x   /  AST  26  /  ALT  21  /  AlkPhos  82  06-24      LIVER FUNCTIONS - ( 2022 11:09 )  Alb: 2.9 g/dL / Pro: 6.5 gm/dL / ALK PHOS: 82 U/L / ALT: 21 U/L / AST: 26 U/L / GGT: x             PT/INR - ( 2022 11:09 )   PT: 16.8 sec;   INR: 1.44 ratio         PTT - ( 2022 11:09 )  PTT:30.4 sec    CARDIAC MARKERS ( 2022 11:09 )  x     / x     / 343 U/L / x     / x            Urinalysis Basic - ( 2022 12:32 )    Color: Yellow / Appearance: Clear / S.015 / pH: x  Gluc: x / Ketone: Moderate  / Bili: Small / Urobili: 8   Blood: x / Protein: 30 mg/dL / Nitrite: Negative   Leuk Esterase: Trace / RBC: 0-2 /HPF / WBC 3-5   Sq Epi: x / Non Sq Epi: Few / Bacteria: Occasional      MEDICATIONS  (STANDING):  busPIRone 15 milliGRAM(s) Oral two times a day  cefepime   IVPB 2000 milliGRAM(s) IV Intermittent every 8 hours  ciprofloxacin  0.3% Ophthalmic Solution 1 Drop(s) Both EYES every 2 hours  dextrose 5% + sodium chloride 0.9%. 1000 milliLiter(s) (75 mL/Hr) IV Continuous <Continuous>  enoxaparin Injectable 40 milliGRAM(s) SubCutaneous every 24 hours  escitalopram 10 milliGRAM(s) Oral daily  guaiFENesin  milliGRAM(s) Oral every 12 hours  memantine 10 milliGRAM(s) Oral two times a day  QUEtiapine 25 milliGRAM(s) Oral daily  senna 2 Tablet(s) Oral at bedtime    MEDICATIONS  (PRN):  acetaminophen     Tablet .. 650 milliGRAM(s) Oral every 6 hours PRN Temp greater or equal to 38C (100.4F), Mild Pain (1 - 3)  aluminum hydroxide/magnesium hydroxide/simethicone Suspension 30 milliLiter(s) Oral every 4 hours PRN Dyspepsia  melatonin 3 milliGRAM(s) Oral at bedtime PRN Insomnia  ondansetron Injectable 4 milliGRAM(s) IV Push every 8 hours PRN Nausea and/or Vomiting

## 2022-06-27 NOTE — PROGRESS NOTE ADULT - ASSESSMENT
87/M, with a PMHx of anxiety, dementia, major depressive disorder, admitted from snf on 6/24 for evaluation of change in mental status, not interacting; on imaging found to have pneumonia; patient unable to provide history and history per medical record.     1. Patient admitted with change in mental status, noted with pneumonia on admission; patient at risk for gram negative rods and other resistant bacteria   - follow up cultures   - serial cbc and monitor temperature   - reviewed prior medical records to evaluate for resistant or atypical pathogens   - oxygen and nebs as needed   - day #3-4 cefepime  - tolerating antibiotics without rashes or side effects   - consideration for palliative evaluation  2. other issues: per medicine

## 2022-06-27 NOTE — PROGRESS NOTE ADULT - ASSESSMENT
87/M, does not give any history,  with a PMHx of anxiety, dementia, major depressive disorder, was sent to the ED from [facility] for AMS reportedly starting 1.5 hours PTA. triage note states pt was difficult to arouse and difficulty with walking. Code stroke called in ED. Pt seen by neuro, doubt any CVA.     Found to have b/l PNA. COVID PCR negative    Pt admitted with ::    AMS/Metabolic encephalopathy  b/l PNA; no COVID PNA ;  HCAP ; Gram neg Rods and resistant bacteria suspected  Mild Hypoxia / acute hypoxic respiratory failure  Dementia  productive cough   conjunctivitis b/l     PLAN:    admit to med floor  iv fluids given  iv cefepime   ID consult  f/u blood cx  f/u COVID PCR; neg  supportive O2  neuro consult appreciated; doubt tia/cva  add Mucinex   cipro eye drops    Prognosis poor    palliative care consult requested      DVT PPX: lovenox    GOC and advance care planning meeting done with the patient's son, Mauro. He wishes his dad not to be resuscitated and intubated which would require mechanical ventilation. He is DNR/DNI.

## 2022-06-28 LAB
ADD ON TEST-SPECIMEN IN LAB: SIGNIFICANT CHANGE UP
ALBUMIN SERPL ELPH-MCNC: 2.5 G/DL — LOW (ref 3.3–5)
ALP SERPL-CCNC: 75 U/L — SIGNIFICANT CHANGE UP (ref 40–120)
ALT FLD-CCNC: 18 U/L — SIGNIFICANT CHANGE UP (ref 12–78)
ANION GAP SERPL CALC-SCNC: 5 MMOL/L — SIGNIFICANT CHANGE UP (ref 5–17)
AST SERPL-CCNC: 15 U/L — SIGNIFICANT CHANGE UP (ref 15–37)
BILIRUB SERPL-MCNC: 1.8 MG/DL — HIGH (ref 0.2–1.2)
BUN SERPL-MCNC: 10 MG/DL — SIGNIFICANT CHANGE UP (ref 7–23)
CALCIUM SERPL-MCNC: 8.7 MG/DL — SIGNIFICANT CHANGE UP (ref 8.5–10.1)
CHLORIDE SERPL-SCNC: 106 MMOL/L — SIGNIFICANT CHANGE UP (ref 96–108)
CO2 SERPL-SCNC: 30 MMOL/L — SIGNIFICANT CHANGE UP (ref 22–31)
CREAT SERPL-MCNC: 0.66 MG/DL — SIGNIFICANT CHANGE UP (ref 0.5–1.3)
EGFR: 92 ML/MIN/1.73M2 — SIGNIFICANT CHANGE UP
GLUCOSE SERPL-MCNC: 99 MG/DL — SIGNIFICANT CHANGE UP (ref 70–99)
HCT VFR BLD CALC: 38.6 % — LOW (ref 39–50)
HGB BLD-MCNC: 12.5 G/DL — LOW (ref 13–17)
MCHC RBC-ENTMCNC: 30.6 PG — SIGNIFICANT CHANGE UP (ref 27–34)
MCHC RBC-ENTMCNC: 32.4 GM/DL — SIGNIFICANT CHANGE UP (ref 32–36)
MCV RBC AUTO: 94.6 FL — SIGNIFICANT CHANGE UP (ref 80–100)
PLATELET # BLD AUTO: 200 K/UL — SIGNIFICANT CHANGE UP (ref 150–400)
POTASSIUM SERPL-MCNC: 3 MMOL/L — LOW (ref 3.5–5.3)
POTASSIUM SERPL-SCNC: 3 MMOL/L — LOW (ref 3.5–5.3)
PROT SERPL-MCNC: 6.4 GM/DL — SIGNIFICANT CHANGE UP (ref 6–8.3)
RBC # BLD: 4.08 M/UL — LOW (ref 4.2–5.8)
RBC # FLD: 12.2 % — SIGNIFICANT CHANGE UP (ref 10.3–14.5)
SODIUM SERPL-SCNC: 141 MMOL/L — SIGNIFICANT CHANGE UP (ref 135–145)
WBC # BLD: 7.81 K/UL — SIGNIFICANT CHANGE UP (ref 3.8–10.5)
WBC # FLD AUTO: 7.81 K/UL — SIGNIFICANT CHANGE UP (ref 3.8–10.5)

## 2022-06-28 PROCEDURE — 99222 1ST HOSP IP/OBS MODERATE 55: CPT

## 2022-06-28 PROCEDURE — 99497 ADVNCD CARE PLAN 30 MIN: CPT | Mod: 25

## 2022-06-28 PROCEDURE — 99232 SBSQ HOSP IP/OBS MODERATE 35: CPT

## 2022-06-28 RX ORDER — QUETIAPINE FUMARATE 200 MG/1
1 TABLET, FILM COATED ORAL
Qty: 0 | Refills: 0 | DISCHARGE

## 2022-06-28 RX ORDER — ESCITALOPRAM OXALATE 10 MG/1
1 TABLET, FILM COATED ORAL
Qty: 0 | Refills: 0 | DISCHARGE

## 2022-06-28 RX ORDER — CIPROFLOXACIN HCL 0.3 %
1 DROPS OPHTHALMIC (EYE) EVERY 4 HOURS
Refills: 0 | Status: DISCONTINUED | OUTPATIENT
Start: 2022-06-28 | End: 2022-07-01

## 2022-06-28 RX ORDER — LANOLIN ALCOHOL/MO/W.PET/CERES
1 CREAM (GRAM) TOPICAL
Qty: 0 | Refills: 0 | DISCHARGE

## 2022-06-28 RX ORDER — SENNA PLUS 8.6 MG/1
2 TABLET ORAL
Qty: 0 | Refills: 0 | DISCHARGE

## 2022-06-28 RX ORDER — POTASSIUM CHLORIDE 20 MEQ
40 PACKET (EA) ORAL EVERY 4 HOURS
Refills: 0 | Status: COMPLETED | OUTPATIENT
Start: 2022-06-28 | End: 2022-06-28

## 2022-06-28 RX ADMIN — CEFEPIME 100 MILLIGRAM(S): 1 INJECTION, POWDER, FOR SOLUTION INTRAMUSCULAR; INTRAVENOUS at 21:56

## 2022-06-28 RX ADMIN — Medication 1 DROP(S): at 00:49

## 2022-06-28 RX ADMIN — ESCITALOPRAM OXALATE 10 MILLIGRAM(S): 10 TABLET, FILM COATED ORAL at 09:04

## 2022-06-28 RX ADMIN — Medication 1 DROP(S): at 13:20

## 2022-06-28 RX ADMIN — ENOXAPARIN SODIUM 40 MILLIGRAM(S): 100 INJECTION SUBCUTANEOUS at 09:03

## 2022-06-28 RX ADMIN — Medication 1 DROP(S): at 03:14

## 2022-06-28 RX ADMIN — Medication 1 DROP(S): at 11:00

## 2022-06-28 RX ADMIN — CEFEPIME 100 MILLIGRAM(S): 1 INJECTION, POWDER, FOR SOLUTION INTRAMUSCULAR; INTRAVENOUS at 13:20

## 2022-06-28 RX ADMIN — QUETIAPINE FUMARATE 25 MILLIGRAM(S): 200 TABLET, FILM COATED ORAL at 09:03

## 2022-06-28 RX ADMIN — Medication 1 DROP(S): at 09:04

## 2022-06-28 RX ADMIN — Medication 1 DROP(S): at 21:57

## 2022-06-28 RX ADMIN — Medication 40 MILLIEQUIVALENT(S): at 15:18

## 2022-06-28 RX ADMIN — MEMANTINE HYDROCHLORIDE 10 MILLIGRAM(S): 10 TABLET ORAL at 21:57

## 2022-06-28 RX ADMIN — MEMANTINE HYDROCHLORIDE 10 MILLIGRAM(S): 10 TABLET ORAL at 09:04

## 2022-06-28 RX ADMIN — Medication 15 MILLIGRAM(S): at 09:04

## 2022-06-28 RX ADMIN — Medication 1 DROP(S): at 04:25

## 2022-06-28 RX ADMIN — Medication 1 DROP(S): at 08:04

## 2022-06-28 RX ADMIN — Medication 600 MILLIGRAM(S): at 09:03

## 2022-06-28 RX ADMIN — Medication 1 DROP(S): at 17:38

## 2022-06-28 RX ADMIN — SENNA PLUS 2 TABLET(S): 8.6 TABLET ORAL at 21:57

## 2022-06-28 RX ADMIN — Medication 3 MILLIGRAM(S): at 21:57

## 2022-06-28 RX ADMIN — CEFEPIME 100 MILLIGRAM(S): 1 INJECTION, POWDER, FOR SOLUTION INTRAMUSCULAR; INTRAVENOUS at 06:34

## 2022-06-28 RX ADMIN — Medication 1 DROP(S): at 06:34

## 2022-06-28 RX ADMIN — Medication 15 MILLIGRAM(S): at 21:58

## 2022-06-28 RX ADMIN — Medication 40 MILLIEQUIVALENT(S): at 17:37

## 2022-06-28 NOTE — PROGRESS NOTE ADULT - SUBJECTIVE AND OBJECTIVE BOX
Cheif complaints and Diagnosis: b/l PNA / hypokalemia    Subjective: no complaints      REVIEW OF SYSTEMS:    unable to obtain secondary to lethargy, weakness      Vital Signs Last 24 Hrs  T(C): 36.8 (28 Jun 2022 08:45), Max: 36.9 (27 Jun 2022 15:30)  T(F): 98.3 (28 Jun 2022 08:45), Max: 98.4 (27 Jun 2022 15:30)  HR: 70 (28 Jun 2022 08:45) (70 - 71)  BP: 138/81 (28 Jun 2022 08:45) (138/81 - 156/93)  BP(mean): --  RR: 18 (28 Jun 2022 08:45) (18 - 18)  SpO2: 95% (28 Jun 2022 08:45) (94% - 95%)    HEENT:   pupils equal and reactive, EOMI, no oropharyngeal lesions, erythema, exudates, oral thrush    NECK:   supple, no carotid bruits, no palpable lymph nodes, no thyromegaly    CV:  +S1, +S2, regular, no murmurs or rubs    RESP:   lungs clear to auscultation bilaterally, no wheezing, rales, rhonchi, good air entry bilaterally    BREAST:  not examined    GI:  abdomen soft, non-tender, non-distended, normal BS, no bruits, no abdominal masses, no palpable masses    RECTAL:  not examined    :  not examined    MSK:   normal muscle tone, no atrophy, no rigidity, no contractions    EXT:   no clubbing, no cyanosis, no edema, no calf pain, swelling or erythema    VASCULAR:  pulses equal and symmetric in the upper and lower extremities    NEURO:  , no focal neurological deficits, follows all commands, able to move extremities spontaneously    SKIN:  no ulcers, lesions or rashes    MEDICATIONS  (STANDING):  busPIRone 15 milliGRAM(s) Oral two times a day  cefepime   IVPB 2000 milliGRAM(s) IV Intermittent every 8 hours  ciprofloxacin  0.3% Ophthalmic Solution 1 Drop(s) Both EYES every 4 hours  enoxaparin Injectable 40 milliGRAM(s) SubCutaneous every 24 hours  escitalopram 10 milliGRAM(s) Oral daily  guaiFENesin  milliGRAM(s) Oral every 12 hours  memantine 10 milliGRAM(s) Oral two times a day  potassium chloride    Tablet ER 40 milliEquivalent(s) Oral every 4 hours  QUEtiapine 25 milliGRAM(s) Oral daily  senna 2 Tablet(s) Oral at bedtime    MEDICATIONS  (PRN):  acetaminophen     Tablet .. 650 milliGRAM(s) Oral every 6 hours PRN Temp greater or equal to 38C (100.4F), Mild Pain (1 - 3)  aluminum hydroxide/magnesium hydroxide/simethicone Suspension 30 milliLiter(s) Oral every 4 hours PRN Dyspepsia  melatonin 3 milliGRAM(s) Oral at bedtime PRN Insomnia  ondansetron Injectable 4 milliGRAM(s) IV Push every 8 hours PRN Nausea and/or Vomiting      28 Jun 2022 12:27    141    |  106    |  10     ----------------------------<  99     3.0     |  30     |  0.66     Ca    8.7        28 Jun 2022 12:27  Mg     2.3       28 Jun 2022 12:27    TPro  6.4    /  Alb  2.5    /  TBili  1.8    /  DBili  x      /  AST  15     /  ALT  18     /  AlkPhos  75     28 Jun 2022 12:27  LIVER FUNCTIONS - ( 28 Jun 2022 12:27 )  Alb: 2.5 g/dL / Pro: 6.4 gm/dL / ALK PHOS: 75 U/L / ALT: 18 U/L / AST: 15 U/L / GGT: x         CBC Full  -  ( 28 Jun 2022 12:27 )  WBC Count : 7.81 K/uL  Hemoglobin : 12.5 g/dL  Hematocrit : 38.6 %  Platelet Count - Automated : 200 K/uL  Mean Cell Volume : 94.6 fl  Mean Cell Hemoglobin : 30.6 pg  Mean Cell Hemoglobin Concentration : 32.4 gm/dL            Assessment and Plan:     87/M, does not give any history,  with a PMHx of anxiety, dementia, major depressive disorder, was sent to the ED from [facility] for AMS reportedly starting 1.5 hours PTA. triage note states pt was difficult to arouse and difficulty with walking. Code stroke called in ED. Pt seen by neuro, doubt any CVA.     Found to have b/l PNA. COVID PCR negative      1-AMS/Metabolic encephalopathy secondary to b/l PNA; no COVID PNA ;  HCAP ; Gram neg Rods and resistant bacteria suspected  Mild Hypoxia / acute hypoxic respiratory failure  Dementia  productive cough   c/w cefepime      5-Fueozkygerw-obcpggklzewu      3-Goals of care/ advance care planning:  Prognosis poor  palliative care consult requested      4-DVT PPX: lovenox    5-GOC and advance care planning meeting done with the patient's son, Mauro. He wishes his dad not to be resuscitated and intubated which would require mechanical ventilation. He is DNR/DNI.

## 2022-06-28 NOTE — GOALS OF CARE CONVERSATION - ADVANCED CARE PLANNING - CONVERSATION DETAILS
HPI: As per medical notes,   87/M, does not give any history,  with a PMHx of anxiety, dementia, major depressive disorder, was sent to the ED from [facility] for AMS reportedly starting 1.5 hours PTA. triage note states pt was difficult to arouse and difficulty with walking. Code stroke called in ED. Pt seen by neuro, doubt any CVA. Found to have b/l PNA.  (24 Jun 2022 14:47)      PERTINENT PMH REVIEWED:  [ x ] YES [ ] NO           Primary Contact:  cisco Ragland ([phone]    HCP [ x ] Surrogate [   ] Guardian [   ]    Mental Status: Alert  [  ] Oriented [  ] Confused [ c ] Lethargic [  ]  Concerns of Depression [  ] Unable to assess but does hx dx of MDD  Anxiety [   ] Unable to assess  Baseline ADLs (prior to admission):  Independent [ x ] moderately [ ] fully   Dependent   [ ] moderately [ ]fully    Family Meeting attendees: Awaiting call from family with time for family meeting.     Anticipated Grief: Patient[ x ] Family [  ]    Caregiver [place] Assessed: Yes [ x ] No [  ]    Mandaeism: Pentecostalism    Spiritual Concerns: None reported.     Goals of Care: TBD    Previous Services: formerly [facility] - Memory Care unit     ADVANCE DIRECTIVES:  [ x ] YES [ ] NO    - MOLST reflecting DNR/DNI wishes   - Health Care Proxy naming son Tim as primary & son Mauro as alternate.    - Living Will     Anticipated D/C Plan: TBD                     Summary: This SW met with pt's son Tim who is at bedside, to introduce team & offer support. Palliative SW role explained. Pall NP Marely present initially. Pt appears alert but confused at this time. Tim confirms that prior to hospitalization, pt resides at formerly [facility] in the memory care unit. He reports that he has been doing well there & has been independent with most ADLs & using RW to assist with walking. They state that although pt has dx of dementia, his current mental status is a big change for him.  Tim reports that pt is a , Retired  & has been very healthy up until this hospitalization.     Tim confirms wishes reflected on MOLST, DNI/DNI and reports that his father would not want anything invasive. SW reviewed the different options that could be possible following hospitalization, including home care at [facility], hospice at [facility], [facility] to SNF LTC transition. Cisco Dumont shares that they would be interested in the [facility] as a possible option for placement. PT danielal would be encouraged. Tim would like to discuss further with other family members present.     Tim has 3 other siblings who would benefit from being present for conversation. Tim will speak to them & let us know of a day/time that works best to have a family meeting.    MOLST & HCP on chart. Emotional support provided. Will await time from family for GOC meeting. RN & SW aware. Our team will continue to follow. HPI: As per medical notes,   87/M, does not give any history,  with a PMHx of anxiety, dementia, major depressive disorder, was sent to the ED from [facility] for AMS reportedly starting 1.5 hours PTA. triage note states pt was difficult to arouse and difficulty with walking. Code stroke called in ED. Pt seen by neuro, doubt any CVA. Found to have b/l PNA.  (24 Jun 2022 14:47)      PERTINENT PMH REVIEWED:  [ x ] YES [ ] NO           Primary Contact:  cisco Ragland ([phone]    HCP [ x ] Surrogate [   ] Guardian [   ]    Mental Status: Alert  [  ] Oriented [  ] Confused [ c ] Lethargic [  ]  Concerns of Depression [  ] Unable to assess but does hx dx of MDD  Anxiety [   ] Unable to assess  Baseline ADLs (prior to admission):  Independent [ x ] moderately [ ] fully   Dependent   [ ] moderately [ ]fully    Family Meeting attendees: Awaiting call from family with time for family meeting.     Anticipated Grief: Patient[ x ] Family [  ]    Caregiver [place] Assessed: Yes [ x ] No [  ]    Baptist: Faith    Spiritual Concerns: None reported.     Goals of Care: TBD    Previous Services: [facility] - Memory Care unit     ADVANCE DIRECTIVES:  [ x ] YES [ ] NO    - MOLST reflecting DNR/DNI wishes   - Health Care Proxy naming son Tim as primary & son Mauro as alternate.    - Living Will     Anticipated D/C Plan: TBD                     Summary: This SW met with pt's son Tim who is at bedside, to introduce team & offer support. Palliative SW role explained. Pall NP Marely present initially. Pt appears alert but confused at this time. Tim confirms that prior to hospitalization, pt resides at [facility] in the memory care unit. He reports that he has been doing well there & has been independent with most ADLs & using RW to assist with walking. He states that although pt has dx of dementia, his current mental status is a big change for him.  Tim reports that pt is a [place], Retired  & has been very healthy up until this hospitalization.     Tim confirms wishes reflected on MOLST, DNI/DNI and reports that his father would not want anything invasive. SW reviewed the different options that could be possible following hospitalization, including home care at [facility], hospice at [facility], [facility] to SNF LTC transition. Cisco Dumont shares that they would be interested in the [facility] as a possible option for placement. PT danielal would be encouraged. Tim would like to discuss further with other family members present.     Tim has 3 other siblings who would benefit from being present for conversation. Tim will speak to them & let us know of a day/time that works best to have a family meeting.    MOLST & HCP on chart. Emotional support provided. Will await time from family for GOC meeting. RN & SW aware. Our team will continue to follow.

## 2022-06-28 NOTE — CONSULT NOTE ADULT - SUBJECTIVE AND OBJECTIVE BOX
HPI: Pt is a 87y old Male with hx of       PAIN: ( )Yes   (x )No  patient appears comfortable at this time     DYSPNEA: ( ) Yes  (x ) No  Level:    PAST MEDICAL & SURGICAL HISTORY:  Dementia  Anxiety  Major depressive disorder  No significant past surgical history    SOCIAL HX:    Hx opiate tolerance ( )YES  ( )NO    Baseline ADLs  (Prior to Admission)  ( ) Independent   ( )Dependent    FAMILY HISTORY:  Family history unobtainable    Review of Systems:    Unable to obtain/Limited due to: Dementia     PHYSICAL EXAM:    Vital Signs Last 24 Hrs  T(C): 36.8 (28 Jun 2022 08:45), Max: 36.9 (27 Jun 2022 15:30)  T(F): 98.3 (28 Jun 2022 08:45), Max: 98.4 (27 Jun 2022 15:30)  HR: 70 (28 Jun 2022 08:45) (70 - 71)  BP: 138/81 (28 Jun 2022 08:45) (138/81 - 156/93)  RR: 18 (28 Jun 2022 08:45) (18 - 18)  SpO2: 95% (28 Jun 2022 08:45) (94% - 95%)    PPSV2:   %  FAST:    General:  Mental Status:  HEENT:  Lungs:  Cardiac:  GI:  :  Ext:  Neuro:    LABS:    Albumin: Albumin, Serum: 2.9 g/dL (06-24 @ 11:09)    Allergies    No Known Allergies    Intolerances      MEDICATIONS  (STANDING):  busPIRone 15 milliGRAM(s) Oral two times a day  cefepime   IVPB 2000 milliGRAM(s) IV Intermittent every 8 hours  enoxaparin Injectable 40 milliGRAM(s) SubCutaneous every 24 hours  escitalopram 10 milliGRAM(s) Oral daily  guaiFENesin  milliGRAM(s) Oral every 12 hours  memantine 10 milliGRAM(s) Oral two times a day  QUEtiapine 25 milliGRAM(s) Oral daily  senna 2 Tablet(s) Oral at bedtime    MEDICATIONS  (PRN):  acetaminophen     Tablet .. 650 milliGRAM(s) Oral every 6 hours PRN Temp greater or equal to 38C (100.4F), Mild Pain (1 - 3)  aluminum hydroxide/magnesium hydroxide/simethicone Suspension 30 milliLiter(s) Oral every 4 hours PRN Dyspepsia  melatonin 3 milliGRAM(s) Oral at bedtime PRN Insomnia  ondansetron Injectable 4 milliGRAM(s) IV Push every 8 hours PRN Nausea and/or Vomiting      RADIOLOGY/ADDITIONAL STUDIES:    ACC: [id number] EXAM:  XR CHEST 1 VIEW                        PROCEDURE DATE:  06/24/2022    INTERPRETATION:  AP erect chest on June 24, 2022 at 11:27 AM. This is a   stroke code.  Heart possibly enlarged. 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  Present film shows infiltration in the right mid lower lung fields since   prior.  There may be developing left lower lung field infiltrate as well.    IMPRESSION: Bilateral infiltrates right greaterthan left presently seen.   Prominent aorta again noted.      ACC: [id number] EXAM:  CT BRAIN STROKE PROTOCOL                        PROCEDURE DATE:  06/24/2022    INTERPRETATION:  Clinical indications: Code stroke.  Multiple axial sections were performed from base skull to vertex without   contrast enhancement. Coronal and sagittal reconstructions were performed   as well  This exam is compared prior head CT performed on August 13, 202  Parenchymal volume loss and chronic microvascular ischemic changes are   again seen and unchanged.  Evaluation of the osseous structures with the appropriate window appears   normal  Right maxillary, bilateral ethmoid and left frontal sinus mucosal   thickening is seen.  Both mastoid and middle ear regions appear clear.    IMPRESSION: No significantchange when allowing for differences in   technique.  Findings discussed with Dr Islas on June 24, 2022 11:09 AM  with read   back.         HPI: Pt is a 87y old Male with hx of does not give any history, PMHx of anxiety, dementia, major depressive disorder, was sent to the ED from [facility] for AMS reportedly starting 1.5 hours PTA. triage note states pt was difficult to arouse and difficulty with walking.  Found to have b/l PNA. COVID PCR  neg. Palliative medicine consulted to establish GOC and advance care planning     6/28/2022 patient seen and examined with son at bedside, patient appears comfortable but patient is laying there mumbling unable to understand what patient is saying. As per son as bedside this is much different for patient, on fathers day he was walking himself and that he was confused but was "more with it"     PAIN: ( )Yes   (x )No  patient appears comfortable at this time     DYSPNEA: ( ) Yes  (x ) No  Level:    PAST MEDICAL & SURGICAL HISTORY:  Dementia  Anxiety  Major depressive disorder  No significant past surgical history    SOCIAL HX:    Hx opiate tolerance ( )YES  ( )NO    Baseline ADLs  (Prior to Admission)  ( ) Independent   ( )Dependent    FAMILY HISTORY:  Family history unobtainable    Review of Systems:    Unable to obtain/Limited due to: Dementia     PHYSICAL EXAM:    Vital Signs Last 24 Hrs  T(C): 36.8 (28 Jun 2022 08:45), Max: 36.9 (27 Jun 2022 15:30)  T(F): 98.3 (28 Jun 2022 08:45), Max: 98.4 (27 Jun 2022 15:30)  HR: 70 (28 Jun 2022 08:45) (70 - 71)  BP: 138/81 (28 Jun 2022 08:45) (138/81 - 156/93)  RR: 18 (28 Jun 2022 08:45) (18 - 18)  SpO2: 95% (28 Jun 2022 08:45) (94% - 95%)    PPSV2: 30  %  FAST: 7a    General: elderly male in bed, NAD   Mental Status: alert but disoriented   HEENT: dry oral mucosa   Lungs: diminished breath sounds b/l   Cardiac: s1s2 +   GI: nontender, non distended +BS   : +voiding   Ext: no edema   Neuro: dementia     LABS:    Albumin: Albumin, Serum: 2.9 g/dL (06-24 @ 11:09)    Allergies    No Known Allergies    Intolerances      MEDICATIONS  (STANDING):  busPIRone 15 milliGRAM(s) Oral two times a day  cefepime   IVPB 2000 milliGRAM(s) IV Intermittent every 8 hours  enoxaparin Injectable 40 milliGRAM(s) SubCutaneous every 24 hours  escitalopram 10 milliGRAM(s) Oral daily  guaiFENesin  milliGRAM(s) Oral every 12 hours  memantine 10 milliGRAM(s) Oral two times a day  QUEtiapine 25 milliGRAM(s) Oral daily  senna 2 Tablet(s) Oral at bedtime    MEDICATIONS  (PRN):  acetaminophen     Tablet .. 650 milliGRAM(s) Oral every 6 hours PRN Temp greater or equal to 38C (100.4F), Mild Pain (1 - 3)  aluminum hydroxide/magnesium hydroxide/simethicone Suspension 30 milliLiter(s) Oral every 4 hours PRN Dyspepsia  melatonin 3 milliGRAM(s) Oral at bedtime PRN Insomnia  ondansetron Injectable 4 milliGRAM(s) IV Push every 8 hours PRN Nausea and/or Vomiting      RADIOLOGY/ADDITIONAL STUDIES:    ACC: [id number] EXAM:  XR CHEST 1 VIEW                        PROCEDURE DATE:  06/24/2022    INTERPRETATION:  AP erect chest on June 24, 2022 at 11:27 AM. This is a   stroke code.  Heart possibly enlarged. 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  Present film shows infiltration in the right mid lower lung fields since   prior.  There may be developing left lower lung field infiltrate as well.    IMPRESSION: Bilateral infiltrates right greaterthan left presently seen.   Prominent aorta again noted.      ACC: [id number] EXAM:  CT BRAIN STROKE PROTOCOL                        PROCEDURE DATE:  06/24/2022    INTERPRETATION:  Clinical indications: Code stroke.  Multiple axial sections were performed from base skull to vertex without   contrast enhancement. Coronal and sagittal reconstructions were performed   as well  This exam is compared prior head CT performed on August 13, 202  Parenchymal volume loss and chronic microvascular ischemic changes are   again seen and unchanged.  Evaluation of the osseous structures with the appropriate window appears   normal  Right maxillary, bilateral ethmoid and left frontal sinus mucosal   thickening is seen.  Both mastoid and middle ear regions appear clear.    IMPRESSION: No significantchange when allowing for differences in   technique.  Findings discussed with Dr Islas on June 24, 2022 11:09 AM  with read   back.

## 2022-06-28 NOTE — PHARMACOTHERAPY INTERVENTION NOTE - COMMENTS
.  Completed medication history as per paperwork from [facility]    Home Medications:  busPIRone 15 mg oral tablet: 1 tab(s) orally 2 times a day (28 Jun 2022 11:03)  escitalopram 10 mg oral tablet: 1 tab(s) orally once a day (28 Jun 2022 11:03)  Melatonin 3 mg oral tablet: 1 tab(s) orally once a day (at bedtime) (28 Jun 2022 11:03)  Namenda 10 mg oral tablet: 1 tab(s) orally 2 times a day (28 Jun 2022 11:03)  Senna 8.6 mg oral tablet: 2 tab(s) orally once a day (at bedtime) (28 Jun 2022 11:03)  SEROquel 25 mg oral tablet: 1 tab(s) orally once a day (28 Jun 2022 11:03)  Vitamin D3 1250 mcg (50,000 intl units) oral capsule: 1 cap(s) orally once a week on Sat (28 Jun 2022 11:03)

## 2022-06-28 NOTE — CONSULT NOTE ADULT - ASSESSMENT
87 year old man hx of dementia, BIBA for change in mental status, grossly non focal exam. CT head shows no acute findings. r/o encephalopathy, ? cause, r/o underlying infection, metabolic derangement. Doubt CVA/TIA.  Suggest:  f/u cbc, metabolic panel, U/A, EKG, CXR, fever w./u as per medicine    Discussed with Dr. Islas
87/M, with a PMHx of anxiety, dementia, major depressive disorder, admitted from snf on 6/24 for evaluation of change in mental status, not interacting; on imaging found to have pneumonia; patient unable to provide history and history per medical record.     1. Patient admitted with change in mental status, noted with pneumonia on admission; patient at risk for gram negative rods and other resistant bacteria   - follow up cultures   - serial cbc and monitor temperature   - reviewed prior medical records to evaluate for resistant or atypical pathogens   - oxygen and nebs as needed   - will continue cefepime as ordered  - consideration for palliative evaluation  2. other issues: per medicine
Pt is a 87y old Male with hx of does not give any history, PMHx of anxiety, dementia, major depressive disorder, was sent to the ED from [facility] for AMS reportedly starting 1.5 hours PTA. triage note states pt was difficult to arouse and difficulty with walking.  Found to have b/l PNA. COVID PCR  neg. Palliative medicine consulted to establish GOC and advance care planning     1) Acute encephalopathy   - PNA   - c/w cefepime   - ID consult appreciated     2) dementia   - PPSV2: 30  %  - FAST: 7a  - PT consult     Process of Care  --Reviewed dx/treatment problems and alignment with Goals of Care    Physical Aspects of Care  --Pain  patient denies at this time  c/w current managment    --Bowel Regimen  denies constipation  risk for constipation d/t immobility  daily dulcolax    --Dyspnea  No SOB at this time  comfortable and in NAD    --Nausea Vomiting  denies    --Weakness  PT as tolerated     Psychological and Psychiatric Aspects of Care:   --Greif/Bereavment: emotional support provided  --Hx of psychiatric dx: none  -Pastoral Care Available PRN     Social Aspects of Care  -SW involved     Cultural Aspects  -Primary Language: English    Goals of Care: awaiting call back from family to schedule time     We discussed Palliative Care team being a supportive team when a patient has ongoing illnesses.  We also discussed that it is not an end of life care service, but can help navigate symptoms and emotional support througout their hospital stay here.      Ethical and Legal Aspects:   NA    Capacity- patient lacks capacity     HCP/Surrogate: Tim patients HCP with alternate son Mauro     Code Status- DNR/I   MOLST- on chart   Dispo Plan- ongoing discussions     Discussed With:     Case coordinated with attending and SW and RN     Time Spent: 90 minutes including the care, coordination and counseling of this patient, 50% of which was spent coordinating and counseling.

## 2022-06-29 LAB
CULTURE RESULTS: SIGNIFICANT CHANGE UP
CULTURE RESULTS: SIGNIFICANT CHANGE UP
SPECIMEN SOURCE: SIGNIFICANT CHANGE UP
SPECIMEN SOURCE: SIGNIFICANT CHANGE UP

## 2022-06-29 PROCEDURE — 99497 ADVNCD CARE PLAN 30 MIN: CPT | Mod: 25

## 2022-06-29 PROCEDURE — 99232 SBSQ HOSP IP/OBS MODERATE 35: CPT

## 2022-06-29 PROCEDURE — 99233 SBSQ HOSP IP/OBS HIGH 50: CPT

## 2022-06-29 RX ADMIN — Medication 1 DROP(S): at 09:46

## 2022-06-29 RX ADMIN — MEMANTINE HYDROCHLORIDE 10 MILLIGRAM(S): 10 TABLET ORAL at 22:16

## 2022-06-29 RX ADMIN — Medication 1 DROP(S): at 22:14

## 2022-06-29 RX ADMIN — CEFEPIME 100 MILLIGRAM(S): 1 INJECTION, POWDER, FOR SOLUTION INTRAMUSCULAR; INTRAVENOUS at 22:15

## 2022-06-29 RX ADMIN — Medication 1 DROP(S): at 17:17

## 2022-06-29 RX ADMIN — ESCITALOPRAM OXALATE 10 MILLIGRAM(S): 10 TABLET, FILM COATED ORAL at 09:29

## 2022-06-29 RX ADMIN — Medication 1 DROP(S): at 05:48

## 2022-06-29 RX ADMIN — SENNA PLUS 2 TABLET(S): 8.6 TABLET ORAL at 22:15

## 2022-06-29 RX ADMIN — QUETIAPINE FUMARATE 25 MILLIGRAM(S): 200 TABLET, FILM COATED ORAL at 09:28

## 2022-06-29 RX ADMIN — ENOXAPARIN SODIUM 40 MILLIGRAM(S): 100 INJECTION SUBCUTANEOUS at 09:27

## 2022-06-29 RX ADMIN — CEFEPIME 100 MILLIGRAM(S): 1 INJECTION, POWDER, FOR SOLUTION INTRAMUSCULAR; INTRAVENOUS at 14:43

## 2022-06-29 RX ADMIN — Medication 1 DROP(S): at 14:43

## 2022-06-29 RX ADMIN — Medication 15 MILLIGRAM(S): at 09:30

## 2022-06-29 RX ADMIN — Medication 15 MILLIGRAM(S): at 22:16

## 2022-06-29 RX ADMIN — MEMANTINE HYDROCHLORIDE 10 MILLIGRAM(S): 10 TABLET ORAL at 09:29

## 2022-06-29 RX ADMIN — CEFEPIME 100 MILLIGRAM(S): 1 INJECTION, POWDER, FOR SOLUTION INTRAMUSCULAR; INTRAVENOUS at 05:48

## 2022-06-29 NOTE — PROGRESS NOTE ADULT - ASSESSMENT
Pt is a 87y old Male with hx of does not give any history, PMHx of anxiety, dementia, major depressive disorder, was sent to the ED from [facility] for AMS reportedly starting 1.5 hours PTA. triage note states pt was difficult to arouse and difficulty with walking.  Found to have b/l PNA. COVID PCR  neg. Palliative medicine consulted to establish GOC and advance care planning     1) Acute encephalopathy   - PNA   - c/w cefepime   - ID consult appreciated     2) dementia   - PPSV2: 30  %  - FAST: 7a  - PT consult     Process of Care  --Reviewed dx/treatment problems and alignment with Goals of Care    Physical Aspects of Care  --Pain  patient denies at this time  c/w current managment    --Bowel Regimen  denies constipation  risk for constipation d/t immobility  daily dulcolax    --Dyspnea  No SOB at this time  comfortable and in NAD    --Nausea Vomiting  denies    --Weakness  PT as tolerated     Psychological and Psychiatric Aspects of Care:   --Greif/Bereavment: emotional support provided  --Hx of psychiatric dx: none  -Pastoral Care Available PRN     Social Aspects of Care  -SW involved     Cultural Aspects  -Primary Language: English    Goals of Care: conference call today at 3pm     We discussed Palliative Care team being a supportive team when a patient has ongoing illnesses.  We also discussed that it is not an end of life care service, but can help navigate symptoms and emotional support througout their hospital stay here.      Ethical and Legal Aspects:   NA    Capacity- patient lacks capacity     HCP/Surrogate: Tim patients HCP with alternate son Mauro     Code Status- DNR/I   MOLST- on chart   Dispo Plan- ongoing discussions     Discussed With:     Case coordinated with attending and SW and RN     Time Spent: 90 minutes including the care, coordination and counseling of this patient, 50% of which was spent coordinating and counseling.

## 2022-06-29 NOTE — PROGRESS NOTE ADULT - ASSESSMENT
87/M, with a PMHx of anxiety, dementia, major depressive disorder, admitted from snf on 6/24 for evaluation of change in mental status, not interacting; on imaging found to have pneumonia; patient unable to provide history and history per medical record.     1. Patient admitted with change in mental status, noted with pneumonia on admission; patient at risk for gram negative rods and other resistant bacteria   - follow up cultures   - serial cbc and monitor temperature   - reviewed prior medical records to evaluate for resistant or atypical pathogens   - oxygen and nebs as needed   - day #5-6 cefepime  - tolerating antibiotics without rashes or side effects   - consideration for palliative evaluation  2. other issues: per medicine

## 2022-06-29 NOTE — GOALS OF CARE CONVERSATION - ADVANCED CARE PLANNING - CONVERSATION DETAILS
This SW spoke with pts children via phone to discuss goals of care, assist with planning and provide supportive counseling. Pt. has been at [facility]  on the dementia unit. Family reports pt. was walking independently, able to recognize family members and was talking prior to admissions. Pts family did report that more recently pt. was becoming more confused.     They shared that pt. was brought to the hospital have found to have pneumonia and since he has been here they have seen a significant decline. They report pt. has not been eating much and has to be hand fed, which is new for him. We did discuss artifical nutrition if pt. was not eating and this SW explained why, due to pts dementia a PEG would not be recommended. Pts family agreed and report pt. had made clear in his living will he would not want to be artificially prolonged.     Pts family reports they have not spoken with a doctor and would like to do so before making any decision. We did review possible options based on how pt. does including pt. begins to improve (starts to eat etc) and can either return to [facility] or go to [facility] if needed (PT to evaluate pt.) VS if pt. declines further focusing on comfort with the support if hospice either at [facility] if they would allow or a comfort focus at a SNF, if [facility] would not allow pt. to return. Pts family expressed understanding of all of the options. They would like to think these over as well as speak with pts hospitalist and requested a follow up meeting tomorrow at 11AM with this SW to further determine plan.    Plan at this time to be determined. Follow up meeting tomorrow at 11AM. Emotional support provided. Our team will continue to follow.

## 2022-06-29 NOTE — GOALS OF CARE CONVERSATION - ADVANCED CARE PLANNING - TREATMENT GUIDELINE COMMENT
Due to the pandemic and visitation restriction conversation held via phone with pts children, including son Tim JOANNA

## 2022-06-29 NOTE — PROGRESS NOTE ADULT - SUBJECTIVE AND OBJECTIVE BOX
Cheif complaints and Diagnosis:  b/l Pneumonia    Subjective:       REVIEW OF SYSTEMS:    CONSTITUTIONAL: No weakness, fevers or chills  EYES/ENT: No visual changes;  No vertigo or throat pain   NECK: No pain or stiffness  RESPIRATORY: No cough, wheezing, hemoptysis; No shortness of breath  CARDIOVASCULAR: No chest pain or palpitations  GASTROINTESTINAL: No abdominal or epigastric pain. No nausea, vomiting, or hematemesis; No diarrhea or constipation. No melena or hematochezia.  GENITOURINARY: No dysuria, frequency or hematuria  NEUROLOGICAL: No numbness or weakness  SKIN: No itching, burning, rashes, or lesions   All other review of systems is negative unless indicated above      Vital Signs Last 24 Hrs  T(C): 36.9 (29 Jun 2022 08:30), Max: 37.7 (28 Jun 2022 15:20)  T(F): 98.5 (29 Jun 2022 08:30), Max: 99.9 (28 Jun 2022 15:20)  HR: 72 (29 Jun 2022 08:30) (64 - 72)  BP: 135/73 (29 Jun 2022 08:30) (135/73 - 146/90)  BP(mean): 107 (28 Jun 2022 15:20) (107 - 107)  RR: 17 (29 Jun 2022 08:30) (16 - 18)  SpO2: 97% (29 Jun 2022 08:30) (95% - 97%)    HEENT:   pupils equal and reactive, EOMI, no oropharyngeal lesions, erythema, exudates, oral thrush    NECK:   supple, no carotid bruits, no palpable lymph nodes, no thyromegaly    CV:  +S1, +S2, regular, no murmurs or rubs    RESP:   lungs clear to auscultation bilaterally, no wheezing, rales, rhonchi, good air entry bilaterally    BREAST:  not examined    GI:  abdomen soft, non-tender, non-distended, normal BS, no bruits, no abdominal masses, no palpable masses    RECTAL:  not examined    :  not examined    MSK:   normal muscle tone, no atrophy, no rigidity, no contractions    EXT:   no clubbing, no cyanosis, no edema, no calf pain, swelling or erythema    VASCULAR:  pulses equal and symmetric in the upper and lower extremities    NEURO:  AAOX3, no focal neurological deficits, follows all commands, able to move extremities spontaneously    SKIN:  no ulcers, lesions or rashes    MEDICATIONS  (STANDING):  busPIRone 15 milliGRAM(s) Oral two times a day  cefepime   IVPB 2000 milliGRAM(s) IV Intermittent every 8 hours  ciprofloxacin  0.3% Ophthalmic Solution 1 Drop(s) Both EYES every 4 hours  enoxaparin Injectable 40 milliGRAM(s) SubCutaneous every 24 hours  escitalopram 10 milliGRAM(s) Oral daily  guaiFENesin  milliGRAM(s) Oral every 12 hours  memantine 10 milliGRAM(s) Oral two times a day  QUEtiapine 25 milliGRAM(s) Oral daily  senna 2 Tablet(s) Oral at bedtime    MEDICATIONS  (PRN):  acetaminophen     Tablet .. 650 milliGRAM(s) Oral every 6 hours PRN Temp greater or equal to 38C (100.4F), Mild Pain (1 - 3)  aluminum hydroxide/magnesium hydroxide/simethicone Suspension 30 milliLiter(s) Oral every 4 hours PRN Dyspepsia  melatonin 3 milliGRAM(s) Oral at bedtime PRN Insomnia  ondansetron Injectable 4 milliGRAM(s) IV Push every 8 hours PRN Nausea and/or Vomiting      28 Jun 2022 12:27    141    |  106    |  10     ----------------------------<  99     3.0     |  30     |  0.66     Ca    8.7        28 Jun 2022 12:27  Mg     2.3       28 Jun 2022 12:27    TPro  6.4    /  Alb  2.5    /  TBili  1.8    /  DBili  x      /  AST  15     /  ALT  18     /  AlkPhos  75     28 Jun 2022 12:27  LIVER FUNCTIONS - ( 28 Jun 2022 12:27 )  Alb: 2.5 g/dL / Pro: 6.4 gm/dL / ALK PHOS: 75 U/L / ALT: 18 U/L / AST: 15 U/L / GGT: x         CBC Full  -  ( 28 Jun 2022 12:27 )  WBC Count : 7.81 K/uL  Hemoglobin : 12.5 g/dL  Hematocrit : 38.6 %  Platelet Count - Automated : 200 K/uL  Mean Cell Volume : 94.6 fl  Mean Cell Hemoglobin : 30.6 pg  Mean Cell Hemoglobin Concentration : 32.4 gm/dL  Auto Neutrophil # : x  Auto Lymphocyte # : x  Auto Monocyte # : x  Auto Eosinophil # : x  Auto Basophil # : x  Auto Neutrophil % : x  Auto Lymphocyte % : x  Auto Monocyte % : x  Auto Eosinophil % : x  Auto Basophil % : x          Assessment and Plan:     87/M, does not give any history,  with a PMHx of anxiety, dementia, major depressive disorder, was sent to the ED from [facility] for AMS reportedly starting 1.5 hours PTA. triage note states pt was difficult to arouse and difficulty with walking. Code stroke called in ED. Pt seen by neuro, doubt any CVA.     Found to have b/l PNA. COVID PCR negative      1-AMS/Metabolic encephalopathy secondary to b/l PNA; no COVID PNA ;  HCAP ; Gram neg Rods and resistant bacteria suspected  Mild Hypoxia / acute hypoxic respiratory failure  Dementia  productive cough   c/w cefepime      7-[id number]      3-Goals of care/ advance care planning:  Prognosis poor  palliative care consult requested      4-DVT PPX: lovenox    5-GOC and advance care planning meeting done with the patient's son, Mauro. He wishes his dad not to be resuscitated and intubated which would require mechanical ventilation. He is DNR/DNI.          Cheif complaints and Diagnosis:  b/l Pneumonia/ dementia    Subjective: patient with dementia; able to answer very few questions      REVIEW OF SYSTEMS:  unable to obtain secondary to dementia      Vital Signs Last 24 Hrs  T(C): 36.9 (29 Jun 2022 08:30), Max: 37.7 (28 Jun 2022 15:20)  T(F): 98.5 (29 Jun 2022 08:30), Max: 99.9 (28 Jun 2022 15:20)  HR: 72 (29 Jun 2022 08:30) (64 - 72)  BP: 135/73 (29 Jun 2022 08:30) (135/73 - 146/90)  BP(mean): 107 (28 Jun 2022 15:20) (107 - 107)  RR: 17 (29 Jun 2022 08:30) (16 - 18)  SpO2: 97% (29 Jun 2022 08:30) (95% - 97%)    HEENT:   pupils equal and reactive, EOMI, no oropharyngeal lesions, erythema, exudates, oral thrush    NECK:   supple, no carotid bruits, no palpable lymph nodes, no thyromegaly    CV:  +S1, +S2, regular, no murmurs or rubs    RESP:   lungs clear to auscultation bilaterally, no wheezing, rales, rhonchi, good air entry bilaterally    BREAST:  not examined    GI:  abdomen soft, non-tender, non-distended, normal BS, no bruits, no abdominal masses, no palpable masses    RECTAL:  not examined    :  not examined    MSK:   normal muscle tone, no atrophy, no rigidity, no contractions    EXT:   no clubbing, no cyanosis, no edema, no calf pain, swelling or erythema    VASCULAR:  pulses equal and symmetric in the upper and lower extremities    NEURO:  , no focal neurological deficits, follows all commands, able to move extremities spontaneously    SKIN:  no ulcers, lesions or rashes    MEDICATIONS  (STANDING):  busPIRone 15 milliGRAM(s) Oral two times a day  cefepime   IVPB 2000 milliGRAM(s) IV Intermittent every 8 hours  ciprofloxacin  0.3% Ophthalmic Solution 1 Drop(s) Both EYES every 4 hours  enoxaparin Injectable 40 milliGRAM(s) SubCutaneous every 24 hours  escitalopram 10 milliGRAM(s) Oral daily  guaiFENesin  milliGRAM(s) Oral every 12 hours  memantine 10 milliGRAM(s) Oral two times a day  QUEtiapine 25 milliGRAM(s) Oral daily  senna 2 Tablet(s) Oral at bedtime    MEDICATIONS  (PRN):  acetaminophen     Tablet .. 650 milliGRAM(s) Oral every 6 hours PRN Temp greater or equal to 38C (100.4F), Mild Pain (1 - 3)  aluminum hydroxide/magnesium hydroxide/simethicone Suspension 30 milliLiter(s) Oral every 4 hours PRN Dyspepsia  melatonin 3 milliGRAM(s) Oral at bedtime PRN Insomnia  ondansetron Injectable 4 milliGRAM(s) IV Push every 8 hours PRN Nausea and/or Vomiting      28 Jun 2022 12:27    141    |  106    |  10     ----------------------------<  99     3.0     |  30     |  0.66     Ca    8.7        28 Jun 2022 12:27  Mg     2.3       28 Jun 2022 12:27    TPro  6.4    /  Alb  2.5    /  TBili  1.8    /  DBili  x      /  AST  15     /  ALT  18     /  AlkPhos  75     28 Jun 2022 12:27  LIVER FUNCTIONS - ( 28 Jun 2022 12:27 )  Alb: 2.5 g/dL / Pro: 6.4 gm/dL / ALK PHOS: 75 U/L / ALT: 18 U/L / AST: 15 U/L / GGT: x         CBC Full  -  ( 28 Jun 2022 12:27 )  WBC Count : 7.81 K/uL  Hemoglobin : 12.5 g/dL  Hematocrit : 38.6 %  Platelet Count - Automated : 200 K/uL  Mean Cell Volume : 94.6 fl  Mean Cell Hemoglobin : 30.6 pg  Mean Cell Hemoglobin Concentration : 32.4 gm/dL  Auto Neutrophil # : x  Auto Lymphocyte # : x  Auto Monocyte # : x  Auto Eosinophil # : x  Auto Basophil # : x  Auto Neutrophil % : x  Auto Lymphocyte % : x  Auto Monocyte % : x  Auto Eosinophil % : x  Auto Basophil % : x          Assessment and Plan:     87/M, does not give any history,  with a PMHx of anxiety, dementia, major depressive disorder, was sent to the ED from [facility] for AMS reportedly starting 1.5 hours PTA. triage note states pt was difficult to arouse and difficulty with walking. Code stroke called in ED. Pt seen by neuro, doubt any CVA.     Found to have b/l PNA. COVID PCR negative      1-b/l PNA; no COVID PNA ;  HCAP ; Gram neg Rods and resistant bacteria suspected  Mild Hypoxia / acute hypoxic respiratory failure  Dementia  productive cough   c/w cefepime >> unlikle that it is helping. as patient still with same mental state. Deshawn he does NOT have encephalopathy, and his currently condition is due to worsening and progressive dementia.   in my opinion there is no AMS/ encephalopathy ; akbar is at his baseline dementia, which is progressive in nature.     1-[id number]-[id number]      3-Goals of care/ advance care planning:  Prognosis poor  palliative care consult requested      4-DVT PPX: lovenox    5-GOC and advance care planning meeting done with the patient's son, Mauro. He wishes his dad not to be resuscitated and intubated which would require mechanical ventilation. He is DNR/DNI.

## 2022-06-29 NOTE — PROGRESS NOTE ADULT - SUBJECTIVE AND OBJECTIVE BOX
Date of service: 06-29-22 @ 17:19    Patient lying in bed; confused    ROS unable to obtain secondary to patient medical condition     MEDICATIONS  (STANDING):  busPIRone 15 milliGRAM(s) Oral two times a day  cefepime   IVPB 2000 milliGRAM(s) IV Intermittent every 8 hours  ciprofloxacin  0.3% Ophthalmic Solution 1 Drop(s) Both EYES every 4 hours  enoxaparin Injectable 40 milliGRAM(s) SubCutaneous every 24 hours  escitalopram 10 milliGRAM(s) Oral daily  guaiFENesin  milliGRAM(s) Oral every 12 hours  memantine 10 milliGRAM(s) Oral two times a day  QUEtiapine 25 milliGRAM(s) Oral daily  senna 2 Tablet(s) Oral at bedtime    MEDICATIONS  (PRN):  acetaminophen     Tablet .. 650 milliGRAM(s) Oral every 6 hours PRN Temp greater or equal to 38C (100.4F), Mild Pain (1 - 3)  aluminum hydroxide/magnesium hydroxide/simethicone Suspension 30 milliLiter(s) Oral every 4 hours PRN Dyspepsia  melatonin 3 milliGRAM(s) Oral at bedtime PRN Insomnia  ondansetron Injectable 4 milliGRAM(s) IV Push every 8 hours PRN Nausea and/or Vomiting      Vital Signs Last 24 Hrs  T(C): 36.9 (29 Jun 2022 08:30), Max: 37.3 (28 Jun 2022 17:42)  T(F): 98.5 (29 Jun 2022 08:30), Max: 99.2 (28 Jun 2022 17:42)  HR: 72 (29 Jun 2022 08:30) (71 - 72)  BP: 160/92 (29 Jun 2022 16:35) (135/73 - 160/92)  BP(mean): --  RR: 17 (29 Jun 2022 08:30) (17 - 18)  SpO2: 97% (29 Jun 2022 08:30) (95% - 97%)        Physical Exam:        Constitutional: frail looking  HEENT: NC/AT, EOMI, PERRLA, conjunctivae clear; ears and nose atraumatic; pharynx clear  Neck: supple; thyroid not palpable  Back: no tenderness  Respiratory: respiratory effort normal; diminished breath sounds  Cardiovascular: S1S2 regular, no murmurs  Abdomen: soft, not tender, not distended, positive BS; no liver or spleen organomegaly  Genitourinary: no suprapubic tenderness  Musculoskeletal: no muscle tenderness, no joint swelling or tenderness  Neurological/ Psychiatric:   moving all extremities  Skin: no rashes; no palpable lesions    Labs: all available labs reviewed                  Labs:                        12.5   7.81  )-----------( 200      ( 28 Jun 2022 12:27 )             38.6     06-28    141  |  106  |  10  ----------------------------<  99  3.0<L>   |  30  |  0.66    Ca    8.7      28 Jun 2022 12:27  Mg     2.3     06-28    TPro  6.4  /  Alb  2.5<L>  /  TBili  1.8<H>  /  DBili  x   /  AST  15  /  ALT  18  /  AlkPhos  75  06-28           Cultures:       Culture - Blood (collected 06-24-22 @ 13:03)  Source: .Blood None  Preliminary Report (06-25-22 @ 19:02):    No growth to date.    Culture - Blood (collected 06-24-22 @ 13:03)  Source: .Blood None  Preliminary Report (06-25-22 @ 19:02):    No growth to date.    Culture - Urine (collected 06-24-22 @ 12:32)  Source: Catheterized None  Final Report (06-25-22 @ 17:24):    No growth              < from: Xray Chest 1 View AP/PA. (06.24.22 @ 11:41) >  ACC: [id number] EXAM:  XR CHEST 1 VIEW                          PROCEDURE DATE:  06/24/2022          INTERPRETATION:  AP erect chest on June 24, 2022 at 11:27 AM. This is a   stroke code.    Heart possibly enlarged. 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    Present film shows infiltration in the right mid lower lung fields since   prior.    There may be developing left lower lung field infiltrate as well.    IMPRESSION: Bilateral infiltrates right greaterthan left presently seen.   Prominent aorta again noted.    < end of copied text >          Radiology: all available radiological tests reviewed    Advanced directives addressed: full resuscitation

## 2022-06-29 NOTE — PROGRESS NOTE ADULT - SUBJECTIVE AND OBJECTIVE BOX
HPI: patient seen and examined with no family at bedside, patient remains confused, appears comfortable at this time.  [facility] meeting scheduled for 3PM today     PAIN: ( )Yes   (x )No  patient appears comfortable at this time     DYSPNEA: ( ) Yes  (x ) No  Level:    Review of Systems:    Unable to obtain/Limited due to: Dementia     PHYSICAL EXAM:    Vital Signs Last 24 Hrs  T(C): 36.9 (29 Jun 2022 08:30), Max: 37.7 (28 Jun 2022 15:20)  T(F): 98.5 (29 Jun 2022 08:30), Max: 99.9 (28 Jun 2022 15:20)  HR: 72 (29 Jun 2022 08:30) (64 - 72)  BP: 135/73 (29 Jun 2022 08:30) (135/73 - 146/90)  BP(mean): 107 (28 Jun 2022 15:20) (107 - 107)  RR: 17 (29 Jun 2022 08:30) (16 - 18)  SpO2: 97% (29 Jun 2022 08:30) (95% - 97%)    PPSV2: 30  %  FAST: 7a    General: elderly male in bed, NAD   Mental Status: alert but disoriented   HEENT: dry oral mucosa   Lungs: diminished breath sounds b/l   Cardiac: s1s2 +   GI: nontender, non distended +BS   : +voiding   Ext: no edema   Neuro: dementia     LABS:                        12.5   7.81  )-----------( 200      ( 28 Jun 2022 12:27 )             38.6     06-28    141  |  106  |  10  ----------------------------<  99  3.0<L>   |  30  |  0.66    Ca    8.7      28 Jun 2022 12:27  Mg     2.3     06-28    TPro  6.4  /  Alb  2.5<L>  /  TBili  1.8<H>  /  DBili  x   /  AST  15  /  ALT  18  /  AlkPhos  75  06-28      Albumin: Albumin, Serum: 2.5 g/dL (06-28 @ 12:27)      Allergies    No Known Allergies    Intolerances      MEDICATIONS  (STANDING):  busPIRone 15 milliGRAM(s) Oral two times a day  cefepime   IVPB 2000 milliGRAM(s) IV Intermittent every 8 hours  ciprofloxacin  0.3% Ophthalmic Solution 1 Drop(s) Both EYES every 4 hours  enoxaparin Injectable 40 milliGRAM(s) SubCutaneous every 24 hours  escitalopram 10 milliGRAM(s) Oral daily  guaiFENesin  milliGRAM(s) Oral every 12 hours  memantine 10 milliGRAM(s) Oral two times a day  QUEtiapine 25 milliGRAM(s) Oral daily  senna 2 Tablet(s) Oral at bedtime    MEDICATIONS  (PRN):  acetaminophen     Tablet .. 650 milliGRAM(s) Oral every 6 hours PRN Temp greater or equal to 38C (100.4F), Mild Pain (1 - 3)  aluminum hydroxide/magnesium hydroxide/simethicone Suspension 30 milliLiter(s) Oral every 4 hours PRN Dyspepsia  melatonin 3 milliGRAM(s) Oral at bedtime PRN Insomnia  ondansetron Injectable 4 milliGRAM(s) IV Push every 8 hours PRN Nausea and/or Vomiting

## 2022-06-30 PROCEDURE — 99497 ADVNCD CARE PLAN 30 MIN: CPT | Mod: 25

## 2022-06-30 PROCEDURE — 99232 SBSQ HOSP IP/OBS MODERATE 35: CPT

## 2022-06-30 PROCEDURE — 99233 SBSQ HOSP IP/OBS HIGH 50: CPT

## 2022-06-30 RX ADMIN — SENNA PLUS 2 TABLET(S): 8.6 TABLET ORAL at 23:21

## 2022-06-30 RX ADMIN — Medication 3 MILLIGRAM(S): at 23:21

## 2022-06-30 RX ADMIN — ENOXAPARIN SODIUM 40 MILLIGRAM(S): 100 INJECTION SUBCUTANEOUS at 09:40

## 2022-06-30 RX ADMIN — CEFEPIME 100 MILLIGRAM(S): 1 INJECTION, POWDER, FOR SOLUTION INTRAMUSCULAR; INTRAVENOUS at 05:44

## 2022-06-30 RX ADMIN — QUETIAPINE FUMARATE 25 MILLIGRAM(S): 200 TABLET, FILM COATED ORAL at 09:41

## 2022-06-30 RX ADMIN — Medication 1 DROP(S): at 23:20

## 2022-06-30 RX ADMIN — ESCITALOPRAM OXALATE 10 MILLIGRAM(S): 10 TABLET, FILM COATED ORAL at 09:41

## 2022-06-30 RX ADMIN — Medication 1 DROP(S): at 05:44

## 2022-06-30 RX ADMIN — Medication 1 DROP(S): at 18:44

## 2022-06-30 RX ADMIN — Medication 15 MILLIGRAM(S): at 23:20

## 2022-06-30 RX ADMIN — Medication 1 DROP(S): at 09:43

## 2022-06-30 RX ADMIN — MEMANTINE HYDROCHLORIDE 10 MILLIGRAM(S): 10 TABLET ORAL at 09:41

## 2022-06-30 RX ADMIN — Medication 15 MILLIGRAM(S): at 09:41

## 2022-06-30 RX ADMIN — Medication 1 DROP(S): at 02:11

## 2022-06-30 RX ADMIN — Medication 1 DROP(S): at 13:41

## 2022-06-30 RX ADMIN — Medication 600 MILLIGRAM(S): at 23:20

## 2022-06-30 NOTE — GOALS OF CARE CONVERSATION - ADVANCED CARE PLANNING - WHAT MATTERS MOST
Providing Pt. with comfort
Family wants patient to maintain whatever independence he can while also understanding his condition may continue to decline. Will discuss GOC further.

## 2022-06-30 NOTE — PHYSICAL THERAPY INITIAL EVALUATION ADULT - PLANNED THERAPY INTERVENTIONS, PT EVAL
Increase mobility when and if possible. Eval. Pt left in bed with all observation section equipment/material intact and bed alarm activated. PAINAD-0/10. Will cont to follow pt and increase as tolerated./bed mobility training/gait training/transfer training

## 2022-06-30 NOTE — PROGRESS NOTE ADULT - SUBJECTIVE AND OBJECTIVE BOX
Date of service: 06-30-22 @ 11:45      Patient confused; afebrile      ROS unable to obtain secondary to patient medical condition     MEDICATIONS  (STANDING):  busPIRone 15 milliGRAM(s) Oral two times a day  cefepime   IVPB 2000 milliGRAM(s) IV Intermittent every 8 hours  ciprofloxacin  0.3% Ophthalmic Solution 1 Drop(s) Both EYES every 4 hours  enoxaparin Injectable 40 milliGRAM(s) SubCutaneous every 24 hours  escitalopram 10 milliGRAM(s) Oral daily  guaiFENesin  milliGRAM(s) Oral every 12 hours  memantine 10 milliGRAM(s) Oral two times a day  QUEtiapine 25 milliGRAM(s) Oral daily  senna 2 Tablet(s) Oral at bedtime    MEDICATIONS  (PRN):  acetaminophen     Tablet .. 650 milliGRAM(s) Oral every 6 hours PRN Temp greater or equal to 38C (100.4F), Mild Pain (1 - 3)  aluminum hydroxide/magnesium hydroxide/simethicone Suspension 30 milliLiter(s) Oral every 4 hours PRN Dyspepsia  melatonin 3 milliGRAM(s) Oral at bedtime PRN Insomnia  ondansetron Injectable 4 milliGRAM(s) IV Push every 8 hours PRN Nausea and/or Vomiting      Vital Signs Last 24 Hrs  T(C): 36.9 (30 Jun 2022 09:07), Max: 36.9 (29 Jun 2022 21:00)  T(F): 98.5 (30 Jun 2022 09:07), Max: 98.5 (30 Jun 2022 09:07)  HR: 70 (30 Jun 2022 09:07) (65 - 72)  BP: 154/85 (30 Jun 2022 09:07) (138/95 - 164/95)  BP(mean): --  RR: 18 (30 Jun 2022 09:07) (17 - 18)  SpO2: 95% (30 Jun 2022 09:07) (94% - 98%)        Physical Exam:      Constitutional: frail looking  HEENT: NC/AT, EOMI, PERRLA, conjunctivae clear; ears and nose atraumatic; pharynx clear  Neck: supple; thyroid not palpable  Back: no tenderness  Respiratory: respiratory effort normal; diminished breath sounds  Cardiovascular: S1S2 regular, no murmurs  Abdomen: soft, not tender, not distended, positive BS; no liver or spleen organomegaly  Genitourinary: no suprapubic tenderness  Musculoskeletal: no muscle tenderness, no joint swelling or tenderness  Neurological/ Psychiatric:   moving all extremities  Skin: no rashes; no palpable lesions    Labs: all available labs reviewed                  Labs:                         Labs:                        12.5   7.81  )-----------( 200      ( 28 Jun 2022 12:27 )             38.6     06-28    141  |  106  |  10  ----------------------------<  99  3.0<L>   |  30  |  0.66    Ca    8.7      28 Jun 2022 12:27  Mg     2.3     06-28    TPro  6.4  /  Alb  2.5<L>  /  TBili  1.8<H>  /  DBili  x   /  AST  15  /  ALT  18  /  AlkPhos  75  06-28           Cultures:       Culture - Blood (collected 06-24-22 @ 13:03)  Source: .Blood None  Final Report (06-29-22 @ 19:00):    No Growth Final    Culture - Blood (collected 06-24-22 @ 13:03)  Source: .Blood None  Final Report (06-29-22 @ 19:00):    No Growth Final    Culture - Urine (collected 06-24-22 @ 12:32)  Source: Catheterized None  Final Report (06-25-22 @ 17:24):    No growth                < from: Xray Chest 1 View AP/PA. (06.24.22 @ 11:41) >  ACC: [id number] EXAM:  XR CHEST 1 VIEW                          PROCEDURE DATE:  06/24/2022          INTERPRETATION:  AP erect chest on June 24, 2022 at 11:27 AM. This is a   stroke code.    Heart possibly enlarged. 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    Present film shows infiltration in the right mid lower lung fields since   prior.    There may be developing left lower lung field infiltrate as well.    IMPRESSION: Bilateral infiltrates right greaterthan left presently seen.   Prominent aorta again noted.    < end of copied text >          Radiology: all available radiological tests reviewed    Advanced directives addressed: full resuscitation

## 2022-06-30 NOTE — PROGRESS NOTE ADULT - PROVIDER SPECIALTY LIST ADULT
Infectious Disease
Hospitalist
Infectious Disease
Infectious Disease
Hospitalist
Palliative Care
Infectious Disease

## 2022-06-30 NOTE — PROGRESS NOTE ADULT - ASSESSMENT
87/M, with a PMHx of anxiety, dementia, major depressive disorder, admitted from snf on 6/24 for evaluation of change in mental status, not interacting; on imaging found to have pneumonia; patient unable to provide history and history per medical record.     1. Patient admitted with change in mental status, noted with pneumonia on admission; patient at risk for gram negative rods and other resistant bacteria   - follow up cultures   - serial cbc and monitor temperature   - reviewed prior medical records to evaluate for resistant or atypical pathogens   - oxygen and nebs as needed   - day #6-7 cefepime  - tolerating antibiotics without rashes or side effects   - will stop antibiotics today and observe off antibiotics  - consideration for palliative evaluation  2. other issues: per medicine

## 2022-06-30 NOTE — PHYSICAL THERAPY INITIAL EVALUATION ADULT - GENERAL OBSERVATIONS, REHAB EVAL
Pt found supine in bed sleeping. Pt not opening his eyes to verbal or tactile cues. Pt also not responding to verbal questioning. PAINAD-0/10.

## 2022-06-30 NOTE — GOALS OF CARE CONVERSATION - ADVANCED CARE PLANNING - NS PRO AD PATIENT TYPE
Health Care Proxy (HCP)
Health Care Proxy (HCP)
Health Care Proxy (HCP)/Do Not Resuscitate (DNR)/Medical Orders for Life-Sustaining Treatment (MOLST)

## 2022-06-30 NOTE — GOALS OF CARE CONVERSATION - ADVANCED CARE PLANNING - CONVERSATION DETAILS
This SW spoke with Pts sons Tim and Mauro this date to discuss goals of care, assist with planning and provide supportive counseling.  Pt. has been at [facility] on the dementia unit.  Family reports Pt. was becoming more confused and has had a decline over the last couple of weeks.  Feelings explored, support provided.      Sons shared what they have been told by the primary team regarding Pts current condition including his prognosis.  Sons state Pt. has been restless when they arrived today and can be agitated at times when staff assist Pt. with ADLs.  Pt. currently taking PO medications.  Family discussed providing Pt. only with medications to keep him pain free and comfortable as well as to assist with any agitation or anxiety in the future.  They desire to stop oral medications at this time with the focus on comfort.      Reviewed discharge options in great details.  Discussed the philosophy of both in patient hospice as well as hospice at the [facility].  Family desires to pursue in patient hospice at this time.  Family desire referral to [facility] in patient hospice.  Family aware in patient hospice can deny Pt, we discussed back up plans including returning to the [facility] with hospice if able vs. SNF with comfort.    Family wish to explore in patient hospice at [facility] hospice [place].  Emotional support provided.  Our team to continue to follow.

## 2022-06-30 NOTE — PROGRESS NOTE ADULT - REASON FOR ADMISSION
AMS, PNA

## 2022-06-30 NOTE — PHYSICAL THERAPY INITIAL EVALUATION ADULT - LEVEL OF INDEPENDENCE: SUPINE/SIT, REHAB EVAL
Pt resisting mobility assessment. Pt's eyes closed throughout attempted mobility and not following verbal or tactile cues. Pt left in bed following attempted mobility with bed alarm activated./dependent (less than 25% patients effort)

## 2022-06-30 NOTE — GOALS OF CARE CONVERSATION - ADVANCED CARE PLANNING - NSPROPTRIGHTCAREGIVER_GEN_A_NUR
information could not be obtained
no

## 2022-06-30 NOTE — PROGRESS NOTE ADULT - SUBJECTIVE AND OBJECTIVE BOX
Cheif complaints and Diagnosis: B/L pneumonia/ failure to thrive/ severe dementia    Subjective: patient at his baselien dementia, unresponsive ; opens eyes intermittently       REVIEW OF SYSTEMS:  unable to obtain secondary to dementia      Vital Signs Last 24 Hrs  T(C): 37.1 (30 Jun 2022 16:53), Max: 37.1 (30 Jun 2022 16:53)  T(F): 98.8 (30 Jun 2022 16:53), Max: 98.8 (30 Jun 2022 16:53)  HR: 69 (30 Jun 2022 16:53) (69 - 72)  BP: 155/85 (30 Jun 2022 16:53) (154/85 - 164/95)  BP(mean): --  RR: 18 (30 Jun 2022 16:53) (17 - 18)  SpO2: 94% (30 Jun 2022 16:53) (94% - 95%)    HEENT:   pupils equal and reactive, EOMI, no oropharyngeal lesions, erythema, exudates, oral thrush    NECK:   supple, no carotid bruits, no palpable lymph nodes, no thyromegaly    CV:  +S1, +S2, regular, no murmurs or rubs    RESP:   lungs clear to auscultation bilaterally, no wheezing, rales, rhonchi, good air entry bilaterally    BREAST:  not examined    GI:  abdomen soft, non-tender, non-distended, normal BS, no bruits, no abdominal masses, no palpable masses    RECTAL:  not examined    :  not examined    MSK:   normal muscle tone, no atrophy, no rigidity, no contractions    EXT:   no clubbing, no cyanosis, no edema, no calf pain, swelling or erythema    VASCULAR:  pulses equal and symmetric in the upper and lower extremities    NEURO:  unable to obtain secondary dementia    SKIN:  no ulcers, lesions or rashes    MEDICATIONS  (STANDING):  busPIRone 15 milliGRAM(s) Oral two times a day  ciprofloxacin  0.3% Ophthalmic Solution 1 Drop(s) Both EYES every 4 hours  enoxaparin Injectable 40 milliGRAM(s) SubCutaneous every 24 hours  escitalopram 10 milliGRAM(s) Oral daily  memantine 10 milliGRAM(s) Oral two times a day  QUEtiapine 25 milliGRAM(s) Oral daily  senna 2 Tablet(s) Oral at bedtime    MEDICATIONS  (PRN):  acetaminophen     Tablet .. 650 milliGRAM(s) Oral every 6 hours PRN Temp greater or equal to 38C (100.4F), Mild Pain (1 - 3)  aluminum hydroxide/magnesium hydroxide/simethicone Suspension 30 milliLiter(s) Oral every 4 hours PRN Dyspepsia  guaiFENesin  milliGRAM(s) Oral every 12 hours PRN Cough  LORazepam   Injectable 0.25 milliGRAM(s) IV Push every 4 hours PRN Agitation  melatonin 3 milliGRAM(s) Oral at bedtime PRN Insomnia  ondansetron Injectable 4 milliGRAM(s) IV Push every 8 hours PRN Nausea and/or Vomiting            Assessment and Plan:     87/M, does not give any history,  with a PMHx of anxiety, dementia, major depressive disorder, was sent to the ED from [facility] for AMS reportedly starting 1.5 hours PTA. triage note states pt was difficult to arouse and difficulty with walking. Code stroke called in ED. Pt seen by neuro, doubt any CVA.     Found to have b/l PNA. COVID PCR negative      1-b/l PNA; no COVID PNA ;  HCAP ; Gram neg Rods and resistant bacteria suspected  Mild Hypoxia / acute hypoxic respiratory failure  Dementia  productive cough   c/w cefepime >> unlikle that it is helping. as patient still with same mental state. Reyesley he does NOT have encephalopathy, and his currently condition is due to worsening and progressive dementia.   in my opinion there is no AMS/ encephalopathy ; musat is at his baseline dementia, which is progressive in nature.   -patient ate 5% of his meal in past 24 hours.      0-Cfietswcdkx-ilhayquhtkyf      3-Goals of care/ advance care planning:  Prognosis poor, severe dementia; not eating due to dementia.   palliative care consult requested  Family wants inpatient hospice, i feel this is appropriate. patient barely eating his food, past 24 hours he ate 5% of meals.         4-DVT PPX: lovenox      5-GOC and advance care planning meeting done with the patient's son, Mauro. He wishes his dad not to be resuscitated and intubated which would require mechanical ventilation. He is DNR/DNI.

## 2022-06-30 NOTE — PHYSICAL THERAPY INITIAL EVALUATION ADULT - PASSIVE RANGE OF MOTION EXAMINATION, REHAB EVAL
Pt not following commands verbal or tactile to assess ROM. Pt also resisting ROM assessment./Left UE Passive ROM was WFL (within functional limits)/Right UE Passive ROM was WFL (within functional limits)/Left LE Passive ROM was WFL (within functional limits)/Right LE Passive ROM was WFL (within functional limits)

## 2022-07-01 ENCOUNTER — TRANSCRIPTION ENCOUNTER (OUTPATIENT)
Age: 87
End: 2022-07-01

## 2022-07-01 VITALS
HEART RATE: 63 BPM | OXYGEN SATURATION: 95 % | SYSTOLIC BLOOD PRESSURE: 155 MMHG | TEMPERATURE: 98 F | RESPIRATION RATE: 17 BRPM | DIASTOLIC BLOOD PRESSURE: 81 MMHG

## 2022-07-01 LAB — SARS-COV-2 RNA SPEC QL NAA+PROBE: SIGNIFICANT CHANGE UP

## 2022-07-01 PROCEDURE — 99239 HOSP IP/OBS DSCHRG MGMT >30: CPT

## 2022-07-01 RX ORDER — MEMANTINE HYDROCHLORIDE 10 MG/1
1 TABLET ORAL
Qty: 0 | Refills: 0 | DISCHARGE

## 2022-07-01 RX ORDER — CHOLECALCIFEROL (VITAMIN D3) 125 MCG
1 CAPSULE ORAL
Qty: 0 | Refills: 0 | DISCHARGE

## 2022-07-01 RX ADMIN — Medication 15 MILLIGRAM(S): at 12:39

## 2022-07-01 RX ADMIN — Medication 1 DROP(S): at 13:57

## 2022-07-01 RX ADMIN — MEMANTINE HYDROCHLORIDE 10 MILLIGRAM(S): 10 TABLET ORAL at 12:40

## 2022-07-01 RX ADMIN — ESCITALOPRAM OXALATE 10 MILLIGRAM(S): 10 TABLET, FILM COATED ORAL at 12:39

## 2022-07-01 RX ADMIN — MEMANTINE HYDROCHLORIDE 10 MILLIGRAM(S): 10 TABLET ORAL at 01:46

## 2022-07-01 RX ADMIN — Medication 1 DROP(S): at 12:39

## 2022-07-01 RX ADMIN — Medication 1 DROP(S): at 01:45

## 2022-07-01 RX ADMIN — Medication 1 DROP(S): at 05:01

## 2022-07-01 RX ADMIN — QUETIAPINE FUMARATE 25 MILLIGRAM(S): 200 TABLET, FILM COATED ORAL at 12:39

## 2022-07-01 NOTE — DISCHARGE NOTE PROVIDER - NSDCMRMEDTOKEN_GEN_ALL_CORE_FT
busPIRone 15 mg oral tablet: 1 tab(s) orally 2 times a day  escitalopram 10 mg oral tablet: 1 tab(s) orally once a day  LORazepam: 0.25 milligram(s) intravenous every 4 hours, As Needed agitation or anxiety   Melatonin 3 mg oral tablet: 1 tab(s) orally once a day (at bedtime)  Senna 8.6 mg oral tablet: 2 tab(s) orally once a day (at bedtime)  SEROquel 25 mg oral tablet: 1 tab(s) orally once a day

## 2022-07-01 NOTE — DISCHARGE NOTE NURSING/CASE MANAGEMENT/SOCIAL WORK - PATIENT PORTAL LINK FT
You can access the FollowMyHealth Patient Portal offered by [facility] by registering at the following website: [email or web address]. By joining [employer]’s FollowMyHealth portal, you will also be able to view your health information using other applications (apps) compatible with our system.

## 2022-07-01 NOTE — DISCHARGE NOTE PROVIDER - HOSPITAL COURSE
87/M, does not give any history,  with a PMHx of anxiety, dementia, major depressive disorder, was sent to the ED from [facility] SNF for AMS reportedly starting 1.5 hours PTA. triage note states pt was difficult to arouse and difficulty with walking. Code stroke called in ED. Pt seen by neuro, doubt any CVA. Found to have b/l PNA. COVID PCR negative. pt completed course of IV cefepime as per ID erecs.  cultures negative.  pt was seen by pall care for advanced dementia and overall poor prognosis given FTT with little or no po intake.  in accordance with patient and family wishes pt to be transferred to in hospice.       Vital Signs Last 24 Hrs  T(C): 36.3 (01 Jul 2022 08:36), Max: 37.1 (30 Jun 2022 16:53)  T(F): 97.3 (01 Jul 2022 08:36), Max: 98.8 (30 Jun 2022 16:53)  HR: 61 (01 Jul 2022 08:36) (61 - 74)  BP: 158/94 (01 Jul 2022 08:36) (148/70 - 158/94)  BP(mean): --  RR: 16 (01 Jul 2022 08:36) (16 - 18)  SpO2: 99% (01 Jul 2022 08:36) (94% - 99%)    GEN: lying in bed, NAD  HEENT:   NC/AT, pupils equal and reactive, EOMI  CV:  +S1, +S2, RRR  RESP:   decreased BS bases b/l   BREAST:  not examined  GI:  abdomen soft, non-tender, non-distended, normoactive BS  NEURO:  AAOX0   SKIN:  no rashes    total time spent 40 mins

## 2022-07-01 NOTE — DISCHARGE NOTE PROVIDER - NSDCCPCAREPLAN_GEN_ALL_CORE_FT
PRINCIPAL DISCHARGE DIAGNOSIS  Diagnosis: Pneumonia  Assessment and Plan of Treatment: .You were found to have pneumonia which is an infection in one or both of the lungs. It causes the air sacs of the lungs to fill up with fluid or pus. It can range from mild to severe, depending on the type of germ causing the infection, your age, and your overall health. you have compelted your antibiotics.      SECONDARY DISCHARGE DIAGNOSES  Diagnosis: Hypoxia  Assessment and Plan of Treatment:     Diagnosis: Altered mental status  Assessment and Plan of Treatment:

## 2022-07-08 DIAGNOSIS — F32.9 MAJOR DEPRESSIVE DISORDER, SINGLE EPISODE, UNSPECIFIED: ICD-10-CM

## 2022-07-08 DIAGNOSIS — H10.89 OTHER CONJUNCTIVITIS: ICD-10-CM

## 2022-07-08 DIAGNOSIS — Z20.822 CONTACT WITH AND (SUSPECTED) EXPOSURE TO COVID-19: ICD-10-CM

## 2022-07-08 DIAGNOSIS — J96.01 ACUTE RESPIRATORY FAILURE WITH HYPOXIA: ICD-10-CM

## 2022-07-08 DIAGNOSIS — R09.02 HYPOXEMIA: ICD-10-CM

## 2022-07-08 DIAGNOSIS — G93.41 METABOLIC ENCEPHALOPATHY: ICD-10-CM

## 2022-07-08 DIAGNOSIS — F03.90 UNSPECIFIED DEMENTIA WITHOUT BEHAVIORAL DISTURBANCE: ICD-10-CM

## 2022-07-08 DIAGNOSIS — F41.9 ANXIETY DISORDER, UNSPECIFIED: ICD-10-CM

## 2022-07-08 DIAGNOSIS — R62.7 ADULT FAILURE TO THRIVE: ICD-10-CM

## 2022-07-08 DIAGNOSIS — J15.6 PNEUMONIA DUE TO OTHER GRAM-NEGATIVE BACTERIA: ICD-10-CM

## 2023-09-01 NOTE — ED PROVIDER NOTE - NS ED SCRIBE STATEMENT
Assumed care of this patient. Family at bedside, call bell within reach.      Chris Escalante, LETY  09/01/23 [id number] Attending

## 2023-11-28 NOTE — ED PROVIDER NOTE - RESPIRATORY, MLM
I did not perform history or physical on Carlos Gong. This patient was seen independently by an FARHEEN. I did review the EKG, which is documented below. EKG  The Ekg interpreted by me in the absence of a cardiologist shows. normal sinus rhythm with a rate of 63  Axis is   Left axis deviation  QTc is  normal  LBBB  No specific ST-T wave changes appreciated. No evidence of acute ischemia.    No significant change from prior EKG dated 6/28/2022          Ron Wellington MD  11/28/23 [id number]
for difficulty urinating, dysuria and frequency. Musculoskeletal:  Negative for back pain and neck pain. Skin:  Negative for rash and wound. Neurological:  Negative for dizziness and light-headedness. Positives and Pertinent negatives as per HPI.      SURGICAL HISTORY     Past Surgical History:   Procedure Laterality Date    CATARACT REMOVAL Bilateral     ~ 2006 with lens implanted    CHOLECYSTECTOMY      EYE SURGERY Left 04/19/2018    Cataract    HERNIA REPAIR      KIDNEY REMOVAL Right 07/25/2022    ROBOTIC LAPAROSCOPIC RADICAL RIGHT NEPHRECTOMY performed by Georges Goode MD at [facility]      both knees: scope for cartilage    NASAL [address]       Current Discharge Medication List        CONTINUE these medications which have NOT CHANGED    Details   Alpha Lipoic Acid 200 MG CAPS Take 200 mg by mouth nightly      CRANBERRY PO Take 1 capsule by mouth daily      Cinnamon 500 MG TABS Take 1 tablet by mouth daily      aspirin 325 MG EC tablet Take 1 tablet by mouth nightly      folic acid (FOLVITE) 1 MG tablet Take 1 tablet by mouth daily      Flaxseed, Linseed, (FLAX SEED OIL) 1000 MG CAPS Take 1 capsule by mouth daily      Melatonin-Pyridoxine 3-1 MG TABS Take 1 tablet by mouth nightly as needed (Sleep.)      Chromium Picolinate 500 MCG TABS Take 1 tablet by mouth daily      turmeric 500 MG CAPS Take 1 capsule by mouth daily      sotalol (BETAPACE) 80 MG tablet Take 1 tablet by mouth 2 times daily  Qty: 180 tablet, Refills: 3    Associated Diagnoses: Paroxysmal atrial fibrillation (HCC)      rosuvastatin (CRESTOR) 10 MG tablet TAKE 1 TABLET EVERY OTHER DAY  Qty: 90 tablet, Refills: 3      losartan (COZAAR) 50 MG tablet Take 1 tablet by mouth daily  Qty: 90 tablet, Refills: 3      vitamin B-12 (CYANOCOBALAMIN) 1000 MCG tablet Take 1 tablet by mouth daily      Pembrolizumab (KEYTRUDA IV) Infuse intravenously every 21 days      ascorbic acid (VITAMIN C) 500 MG
Breath sounds clear and equal bilaterally.

## 2024-03-26 NOTE — ED PROVIDER NOTE - NSICDXNOPASTSURGICALHX_GEN_ALL_ED
Notified with labs, cholesterol stable with excellent HDL, sodium normal at 137, corrected calcium normal, chronic neutropenia stable, differential reviewed not significantly abnormal compared to baseline, A1c stable, repeat labs 1 year, follow-up endocrinology.   <-- Click to add NO significant Past Surgical History